# Patient Record
Sex: MALE | Race: WHITE | NOT HISPANIC OR LATINO | Employment: FULL TIME | URBAN - METROPOLITAN AREA
[De-identification: names, ages, dates, MRNs, and addresses within clinical notes are randomized per-mention and may not be internally consistent; named-entity substitution may affect disease eponyms.]

---

## 2023-07-26 ENCOUNTER — APPOINTMENT (OUTPATIENT)
Dept: GENERAL RADIOLOGY | Facility: CLINIC | Age: 63
End: 2023-07-26
Attending: EMERGENCY MEDICINE
Payer: COMMERCIAL

## 2023-07-26 ENCOUNTER — HOSPITAL ENCOUNTER (EMERGENCY)
Facility: CLINIC | Age: 63
Discharge: HOME | End: 2023-07-26
Attending: EMERGENCY MEDICINE | Admitting: EMERGENCY MEDICINE
Payer: COMMERCIAL

## 2023-07-26 VITALS
WEIGHT: 203.26 LBS | SYSTOLIC BLOOD PRESSURE: 108 MMHG | RESPIRATION RATE: 18 BRPM | BODY MASS INDEX: 29.17 KG/M2 | OXYGEN SATURATION: 98 % | DIASTOLIC BLOOD PRESSURE: 80 MMHG | HEART RATE: 59 BPM | TEMPERATURE: 98.2 F

## 2023-07-26 DIAGNOSIS — R07.9 CHEST PAIN, UNSPECIFIED TYPE: ICD-10-CM

## 2023-07-26 LAB
ANION GAP SERPL CALCULATED.3IONS-SCNC: 10 MMOL/L (ref 7–15)
BASOPHILS # BLD AUTO: 0.1 10E3/UL (ref 0–0.2)
BASOPHILS NFR BLD AUTO: 1 %
BUN SERPL-MCNC: 13.9 MG/DL (ref 8–23)
CALCIUM SERPL-MCNC: 9.4 MG/DL (ref 8.8–10.2)
CHLORIDE SERPL-SCNC: 102 MMOL/L (ref 98–107)
CREAT SERPL-MCNC: 1.09 MG/DL (ref 0.67–1.17)
DEPRECATED HCO3 PLAS-SCNC: 24 MMOL/L (ref 22–29)
EOSINOPHIL # BLD AUTO: 0.4 10E3/UL (ref 0–0.7)
EOSINOPHIL NFR BLD AUTO: 4 %
ERYTHROCYTE [DISTWIDTH] IN BLOOD BY AUTOMATED COUNT: 12.3 % (ref 10–15)
GFR SERPL CREATININE-BSD FRML MDRD: 78 ML/MIN/1.73M2
GLUCOSE SERPL-MCNC: 123 MG/DL (ref 70–99)
HCT VFR BLD AUTO: 42.5 % (ref 40–53)
HGB BLD-MCNC: 14.8 G/DL (ref 13.3–17.7)
HOLD SPECIMEN: NORMAL
HOLD SPECIMEN: NORMAL
IMM GRANULOCYTES # BLD: 0 10E3/UL
IMM GRANULOCYTES NFR BLD: 0 %
LYMPHOCYTES # BLD AUTO: 2.7 10E3/UL (ref 0.8–5.3)
LYMPHOCYTES NFR BLD AUTO: 30 %
MCH RBC QN AUTO: 32.1 PG (ref 26.5–33)
MCHC RBC AUTO-ENTMCNC: 34.8 G/DL (ref 31.5–36.5)
MCV RBC AUTO: 92 FL (ref 78–100)
MONOCYTES # BLD AUTO: 0.8 10E3/UL (ref 0–1.3)
MONOCYTES NFR BLD AUTO: 8 %
NEUTROPHILS # BLD AUTO: 5.2 10E3/UL (ref 1.6–8.3)
NEUTROPHILS NFR BLD AUTO: 57 %
NRBC # BLD AUTO: 0 10E3/UL
NRBC BLD AUTO-RTO: 0 /100
PLATELET # BLD AUTO: 226 10E3/UL (ref 150–450)
POTASSIUM SERPL-SCNC: 4 MMOL/L (ref 3.4–5.3)
RBC # BLD AUTO: 4.61 10E6/UL (ref 4.4–5.9)
SODIUM SERPL-SCNC: 136 MMOL/L (ref 136–145)
TROPONIN T SERPL HS-MCNC: <6 NG/L
TROPONIN T SERPL HS-MCNC: <6 NG/L
WBC # BLD AUTO: 9.1 10E3/UL (ref 4–11)

## 2023-07-26 PROCEDURE — 99285 EMERGENCY DEPT VISIT HI MDM: CPT | Mod: 25

## 2023-07-26 PROCEDURE — 36415 COLL VENOUS BLD VENIPUNCTURE: CPT | Performed by: EMERGENCY MEDICINE

## 2023-07-26 PROCEDURE — 93005 ELECTROCARDIOGRAM TRACING: CPT

## 2023-07-26 PROCEDURE — 80048 BASIC METABOLIC PNL TOTAL CA: CPT | Performed by: EMERGENCY MEDICINE

## 2023-07-26 PROCEDURE — 84484 ASSAY OF TROPONIN QUANT: CPT | Performed by: EMERGENCY MEDICINE

## 2023-07-26 PROCEDURE — 85025 COMPLETE CBC W/AUTO DIFF WBC: CPT | Performed by: EMERGENCY MEDICINE

## 2023-07-26 PROCEDURE — 71046 X-RAY EXAM CHEST 2 VIEWS: CPT

## 2023-07-26 RX ORDER — FAMOTIDINE 20 MG/1
20 TABLET, FILM COATED ORAL 2 TIMES DAILY
Qty: 60 TABLET | Refills: 0 | Status: SHIPPED | OUTPATIENT
Start: 2023-07-26 | End: 2023-08-25

## 2023-07-26 ASSESSMENT — ACTIVITIES OF DAILY LIVING (ADL): ADLS_ACUITY_SCORE: 35

## 2023-07-26 NOTE — ED PROVIDER NOTES
"    History     Chief Complaint:  Chest Pain       HPI     Kendell Caban is a 62 year old male who presents with chest pain and heaviness that occurred between [id number] while at work that lasted for 10 minutes. He reports feeling like a \"300 lb sheldon\" was sitting on his chest and feeling a period of immense pain. He reports pacing, but denies anything making the pain better. He reports eventually getting back to normal and feels about normal now. He experienced minor heart burn over the weekend while he was at the cabin, but reports the pain being different today. He reports experiencing shortness of breath and nothing like the pain he was in earlier. He denies daily medication use, medication allergies, smoking, family history of kidney or heart disease, or eating anything unusual while at his cabin.    Independent Historian:    None    Review of External Notes:  None    Medications:    Flexeril  Roxicodone  Flomax    Past Medical History:    Renal colic    Past Surgical History:    Cystoscopy, retrogrades, ureteroscopy, laser holmium lithotripsy ureter(s), insert stent, combined  Removal pilonidal lesion simple     Physical Exam   Patient Vitals for the past 24 hrs:   BP Temp Temp src Pulse Resp SpO2 Weight   07/26/23 1930 108/80 -- -- 59 18 -- --   07/26/23 1848 113/74 -- -- 66 16 -- --   07/26/23 1833 116/82 -- -- 61 26 -- --   07/26/23 1818 111/76 -- -- 61 14 -- --   07/26/23 1625 120/81 98.2  F (36.8  C) Oral 67 16 98 % 92.2 kg (203 lb 4.2 oz)        Physical Exam    HEENT:    Oropharynx is moist  Eyes:    Conjunctiva normal  Neck:     Supple, no meningismus.     CV:     Regular rate and rhythm.      No murmurs, rubs or gallops.       No unilateral leg swelling.       2+ radial pulses bilateral.       No lower extremity edema.  PULM:    Clear to auscultation bilateral.       No respiratory distress.      Good air exchange.     No rales or wheezing.     No stridor.  ABD:    Soft, non-tender, non-distended.  "      No pulsatile masses.       No rebound, guarding or rigidity.  MSK:     No gross deformity to all four extremities.   LYMPH:   No cervical lymphadenopathy.  NEURO:   Alert. Good muscle tone, no atrophy.  Skin:    Warm, dry and intact.    Psych:    Mood is good and affect is appropriate.      Emergency Department Course   ECG  ECG taken at 1616, ECG read at 1622  Normal sinus rhythm  Normal ECG   Rate 63 bpm. SC interval 190 ms. QRS duration 78 ms. QT/QTc 412/421 ms. P-R-T axes 35 34 15.    Imaging:  Chest XR,  PA & LAT   Final Result   IMPRESSION: Mildly elevated left hemidiaphragm with adjacent left basilar atelectasis. Right lung is clear. No pleural effusions or pneumothorax. Normal cardiac silhouette. Scattered gas-filled nondilated loops of colon are present within the upper    abdomen.        Report per radiology    Laboratory:  Labs Ordered and Resulted from Time of ED Arrival to Time of ED Departure   BASIC METABOLIC PANEL - Abnormal       Result Value    Sodium 136      Potassium 4.0      Chloride 102      Carbon Dioxide (CO2) 24      Anion Gap 10      Urea Nitrogen 13.9      Creatinine 1.09      Calcium 9.4      Glucose 123 (*)     GFR Estimate 78     TROPONIN T, HIGH SENSITIVITY - Normal    Troponin T, High Sensitivity <6     TROPONIN T, HIGH SENSITIVITY - Normal    Troponin T, High Sensitivity <6     CBC WITH PLATELETS AND DIFFERENTIAL    WBC Count 9.1      RBC Count 4.61      Hemoglobin 14.8      Hematocrit 42.5      MCV 92      MCH 32.1      MCHC 34.8      RDW 12.3      Platelet Count 226      % Neutrophils 57      % Lymphocytes 30      % Monocytes 8      % Eosinophils 4      % Basophils 1      % Immature Granulocytes 0      NRBCs per 100 WBC 0      Absolute Neutrophils 5.2      Absolute Lymphocytes 2.7      Absolute Monocytes 0.8      Absolute Eosinophils 0.4      Absolute Basophils 0.1      Absolute Immature Granulocytes 0.0      Absolute NRBCs 0.0       Emergency Department Course &  Assessments:         Interventions:  Medications - No data to display     Assessments:   I obtained history and examined the patient as noted above.    I rechecked and updated the patient. I deemed the patient safe to discharge home.    Independent Interpretation (X-rays, CTs, rhythm strip):  None    Consultations/Discussion of Management or Tests:  None       Social Determinants of Health affecting care:  None     Disposition:  The patient was discharged to home.     Impression & Plan    CMS Diagnoses: None    Medical Decision Makin-year-old male presents with a 10-minute episode of chest pressure.  EKG without ischemic changes.  Initial troponin within normal limits.  2-hour delta troponin remains undetectable and was obtained greater than 6 hours after onset of symptoms thus ruling out myocardial infarction.  He has no ongoing symptoms to suggest pulmonary embolism, aortic dissection or aortic aneurysm.  It is possible symptoms are related to esophageal spasm as he has reported recent difficulty with reflux.  Patient discharged home with a trial of famotidine.  Close follow-up with PCP and return to ED for worsening this.    Diagnosis:    ICD-10-CM    1. Chest pain, unspecified type  R07.9            Discharge Medications:  Discharge Medication List as of 2023  7:34 PM        START taking these medications    Details   famotidine (PEPCID) 20 MG tablet Take 1 tablet (20 mg) by mouth 2 times daily for 30 days, Disp-60 tablet, R-0, Local Print            Scribe Disclosure:  I, Christian Bartlett, am serving as a scribe at 6:03 PM on 2023 to document services personally performed by Aubrey House MD based on my observations and the provider's statements to me.               Aubrey House MD  [phone]

## 2023-07-26 NOTE — ED TRIAGE NOTES
"Pt reports a 10 minute episode of mid sternal chest pain at 1130 today. Pt reports diaphoresis and SOB during episode. States, \"It felt like a 300lb sheldon standing on me.\" Chest pain now resolved.      Triage Assessment       Row Name 07/26/23 1637       Triage Assessment (Adult)    Airway WDL WDL       Respiratory WDL    Respiratory WDL WDL       Skin Circulation/Temperature WDL    Skin Circulation/Temperature WDL WDL       Cardiac WDL    Cardiac WDL chest pain  Chest pain now resolved.       Peripheral/Neurovascular WDL    Peripheral Neurovascular WDL WDL       Cognitive/Neuro/Behavioral WDL    Cognitive/Neuro/Behavioral WDL WDL                    "

## 2023-07-27 LAB
ATRIAL RATE - MUSE: 63 BPM
DIASTOLIC BLOOD PRESSURE - MUSE: NORMAL MMHG
INTERPRETATION ECG - MUSE: NORMAL
P AXIS - MUSE: 35 DEGREES
PR INTERVAL - MUSE: 190 MS
QRS DURATION - MUSE: 78 MS
QT - MUSE: 412 MS
QTC - MUSE: 421 MS
R AXIS - MUSE: 34 DEGREES
SYSTOLIC BLOOD PRESSURE - MUSE: NORMAL MMHG
T AXIS - MUSE: 15 DEGREES
VENTRICULAR RATE- MUSE: 63 BPM

## 2023-07-27 NOTE — DISCHARGE INSTRUCTIONS
Please make an appointment to follow up with [facility] [phone] in 2-3 days even if entirely better.    Discharge Instructions  Chest Pain    You have been seen today for chest pain or discomfort.  At this time, your provider has found no signs that your chest pain is due to a serious or life-threatening condition, (or you have declined more testing and/or admission to the hospital). However, sometimes there is a serious problem that does not show up right away. Your evaluation today may not be complete and you may need further testing and evaluation.     Generally, every Emergency Department visit should have a follow-up clinic visit with either a primary or a specialty clinic/provider. Please follow-up as instructed by your emergency provider today.  Return to the Emergency Department if:  Your chest pain changes, gets worse, starts to happen more often, or comes with less activity.  You are newly short of breath.  You get very weak or tired.  You pass out or faint.  You have any new symptoms, like fever, cough, numb legs, or you cough up blood.  You have anything else that worries you.    Until you follow-up with your regular provider, please do the following:  Take one aspirin daily unless you have an allergy or are told not to by your provider.  If a stress test appointment has been made, go to the appointment.  If you have questions, contact your regular provider.  Follow-up with your regular provider/clinic as directed; this is very important.    If you were given a prescription for medicine here today, be sure to read all of the information (including the package insert) that comes with your prescription.  This will include important information about the medicine, its side effects, and any warnings that you need to know about.  The pharmacist who fills the prescription can provide more information and answer questions you may have about the medicine.  If you have questions or concerns  that the pharmacist cannot address, please call or return to the Emergency Department.       Remember that you can always come back to the Emergency Department if you are not able to see your regular provider in the amount of time listed above, if you get any new symptoms, or if there is anything that worries you.

## 2023-09-07 ENCOUNTER — HOSPITAL ENCOUNTER (INPATIENT)
Facility: CLINIC | Age: 63
LOS: 3 days | Discharge: HOME | DRG: 419 | End: 2023-09-10
Attending: EMERGENCY MEDICINE | Admitting: INTERNAL MEDICINE
Payer: COMMERCIAL

## 2023-09-07 ENCOUNTER — APPOINTMENT (OUTPATIENT)
Dept: ULTRASOUND IMAGING | Facility: CLINIC | Age: 63
DRG: 419 | End: 2023-09-07
Attending: EMERGENCY MEDICINE
Payer: COMMERCIAL

## 2023-09-07 DIAGNOSIS — I48.91 NEW ONSET ATRIAL FIBRILLATION (H): ICD-10-CM

## 2023-09-07 DIAGNOSIS — K21.9 GASTROESOPHAGEAL REFLUX DISEASE WITHOUT ESOPHAGITIS: Primary | ICD-10-CM

## 2023-09-07 DIAGNOSIS — K85.10 GALL STONE PANCREATITIS: ICD-10-CM

## 2023-09-07 LAB
ALBUMIN SERPL BCG-MCNC: 4.3 G/DL (ref 3.5–5.2)
ALP SERPL-CCNC: 150 U/L (ref 40–129)
ALT SERPL W P-5'-P-CCNC: 266 U/L (ref 0–70)
ANION GAP SERPL CALCULATED.3IONS-SCNC: 9 MMOL/L (ref 7–15)
APTT PPP: 24 SECONDS (ref 22–38)
AST SERPL W P-5'-P-CCNC: 379 U/L (ref 0–45)
ATRIAL RATE - MUSE: 131 BPM
BASOPHILS # BLD AUTO: 0.1 10E3/UL (ref 0–0.2)
BASOPHILS NFR BLD AUTO: 0 %
BILIRUB DIRECT SERPL-MCNC: 1.32 MG/DL (ref 0–0.3)
BILIRUB SERPL-MCNC: 2.3 MG/DL
BUN SERPL-MCNC: 14 MG/DL (ref 8–23)
CALCIUM SERPL-MCNC: 10.7 MG/DL (ref 8.8–10.2)
CHLORIDE SERPL-SCNC: 102 MMOL/L (ref 98–107)
CREAT SERPL-MCNC: 1.01 MG/DL (ref 0.67–1.17)
DEPRECATED HCO3 PLAS-SCNC: 28 MMOL/L (ref 22–29)
DIASTOLIC BLOOD PRESSURE - MUSE: NORMAL MMHG
EGFRCR SERPLBLD CKD-EPI 2021: 85 ML/MIN/1.73M2
EOSINOPHIL # BLD AUTO: 0.2 10E3/UL (ref 0–0.7)
EOSINOPHIL NFR BLD AUTO: 2 %
ERYTHROCYTE [DISTWIDTH] IN BLOOD BY AUTOMATED COUNT: 12.4 % (ref 10–15)
GLUCOSE SERPL-MCNC: 136 MG/DL (ref 70–99)
HCT VFR BLD AUTO: 45.8 % (ref 40–53)
HGB BLD-MCNC: 16.1 G/DL (ref 13.3–17.7)
HOLD SPECIMEN: NORMAL
HOLD SPECIMEN: NORMAL
IMM GRANULOCYTES # BLD: 0.1 10E3/UL
IMM GRANULOCYTES NFR BLD: 1 %
INTERPRETATION ECG - MUSE: NORMAL
LIPASE SERPL-CCNC: >3000 U/L (ref 13–60)
LYMPHOCYTES # BLD AUTO: 1.8 10E3/UL (ref 0.8–5.3)
LYMPHOCYTES NFR BLD AUTO: 14 %
MCH RBC QN AUTO: 32.1 PG (ref 26.5–33)
MCHC RBC AUTO-ENTMCNC: 35.2 G/DL (ref 31.5–36.5)
MCV RBC AUTO: 91 FL (ref 78–100)
MONOCYTES # BLD AUTO: 1.1 10E3/UL (ref 0–1.3)
MONOCYTES NFR BLD AUTO: 9 %
NEUTROPHILS # BLD AUTO: 9.9 10E3/UL (ref 1.6–8.3)
NEUTROPHILS NFR BLD AUTO: 74 %
NRBC # BLD AUTO: 0 10E3/UL
NRBC BLD AUTO-RTO: 0 /100
P AXIS - MUSE: NORMAL DEGREES
PLATELET # BLD AUTO: 249 10E3/UL (ref 150–450)
POTASSIUM SERPL-SCNC: 4.4 MMOL/L (ref 3.4–5.3)
PR INTERVAL - MUSE: NORMAL MS
PROT SERPL-MCNC: 7.7 G/DL (ref 6.4–8.3)
QRS DURATION - MUSE: 82 MS
QT - MUSE: 348 MS
QTC - MUSE: 444 MS
R AXIS - MUSE: 16 DEGREES
RBC # BLD AUTO: 5.02 10E6/UL (ref 4.4–5.9)
SODIUM SERPL-SCNC: 139 MMOL/L (ref 136–145)
SYSTOLIC BLOOD PRESSURE - MUSE: NORMAL MMHG
T AXIS - MUSE: 1 DEGREES
TROPONIN T SERPL HS-MCNC: 6 NG/L
VENTRICULAR RATE- MUSE: 98 BPM
WBC # BLD AUTO: 13.1 10E3/UL (ref 4–11)

## 2023-09-07 PROCEDURE — 84484 ASSAY OF TROPONIN QUANT: CPT | Performed by: EMERGENCY MEDICINE

## 2023-09-07 PROCEDURE — C9113 INJ PANTOPRAZOLE SODIUM, VIA: HCPCS | Performed by: SURGERY

## 2023-09-07 PROCEDURE — 99285 EMERGENCY DEPT VISIT HI MDM: CPT | Mod: 25

## 2023-09-07 PROCEDURE — 96374 THER/PROPH/DIAG INJ IV PUSH: CPT

## 2023-09-07 PROCEDURE — 250N000011 HC RX IP 250 OP 636: Mod: JZ | Performed by: INTERNAL MEDICINE

## 2023-09-07 PROCEDURE — 120N000001 HC R&B MED SURG/OB

## 2023-09-07 PROCEDURE — 82248 BILIRUBIN DIRECT: CPT | Performed by: EMERGENCY MEDICINE

## 2023-09-07 PROCEDURE — 250N000011 HC RX IP 250 OP 636: Performed by: EMERGENCY MEDICINE

## 2023-09-07 PROCEDURE — 76705 ECHO EXAM OF ABDOMEN: CPT

## 2023-09-07 PROCEDURE — C9113 INJ PANTOPRAZOLE SODIUM, VIA: HCPCS | Mod: JZ | Performed by: INTERNAL MEDICINE

## 2023-09-07 PROCEDURE — 36415 COLL VENOUS BLD VENIPUNCTURE: CPT | Performed by: INTERNAL MEDICINE

## 2023-09-07 PROCEDURE — 36415 COLL VENOUS BLD VENIPUNCTURE: CPT | Performed by: EMERGENCY MEDICINE

## 2023-09-07 PROCEDURE — 250N000011 HC RX IP 250 OP 636: Performed by: SURGERY

## 2023-09-07 PROCEDURE — 83690 ASSAY OF LIPASE: CPT | Performed by: EMERGENCY MEDICINE

## 2023-09-07 PROCEDURE — 99223 1ST HOSP IP/OBS HIGH 75: CPT | Performed by: INTERNAL MEDICINE

## 2023-09-07 PROCEDURE — 80048 BASIC METABOLIC PNL TOTAL CA: CPT | Performed by: EMERGENCY MEDICINE

## 2023-09-07 PROCEDURE — 250N000013 HC RX MED GY IP 250 OP 250 PS 637: Performed by: EMERGENCY MEDICINE

## 2023-09-07 PROCEDURE — 96375 TX/PRO/DX INJ NEW DRUG ADDON: CPT

## 2023-09-07 PROCEDURE — 93005 ELECTROCARDIOGRAM TRACING: CPT

## 2023-09-07 PROCEDURE — 250N000013 HC RX MED GY IP 250 OP 250 PS 637: Performed by: INTERNAL MEDICINE

## 2023-09-07 PROCEDURE — 258N000003 HC RX IP 258 OP 636: Performed by: INTERNAL MEDICINE

## 2023-09-07 PROCEDURE — 85025 COMPLETE CBC W/AUTO DIFF WBC: CPT | Performed by: EMERGENCY MEDICINE

## 2023-09-07 PROCEDURE — 85730 THROMBOPLASTIN TIME PARTIAL: CPT | Performed by: INTERNAL MEDICINE

## 2023-09-07 RX ORDER — DIPHENHYDRAMINE HYDROCHLORIDE AND LIDOCAINE HYDROCHLORIDE AND ALUMINUM HYDROXIDE AND MAGNESIUM HYDRO
10 KIT ONCE
Status: COMPLETED | OUTPATIENT
Start: 2023-09-07 | End: 2023-09-07

## 2023-09-07 RX ORDER — HYDROMORPHONE HYDROCHLORIDE 1 MG/ML
0.5 INJECTION, SOLUTION INTRAMUSCULAR; INTRAVENOUS; SUBCUTANEOUS ONCE
Status: COMPLETED | OUTPATIENT
Start: 2023-09-07 | End: 2023-09-07

## 2023-09-07 RX ORDER — SODIUM CHLORIDE, SODIUM LACTATE, POTASSIUM CHLORIDE, CALCIUM CHLORIDE 600; 310; 30; 20 MG/100ML; MG/100ML; MG/100ML; MG/100ML
INJECTION, SOLUTION INTRAVENOUS CONTINUOUS
Status: DISCONTINUED | OUTPATIENT
Start: 2023-09-07 | End: 2023-09-10 | Stop reason: HOSPADM

## 2023-09-07 RX ORDER — PIPERACILLIN SODIUM, TAZOBACTAM SODIUM 3; .375 G/15ML; G/15ML
3.38 INJECTION, POWDER, LYOPHILIZED, FOR SOLUTION INTRAVENOUS ONCE
Status: COMPLETED | OUTPATIENT
Start: 2023-09-07 | End: 2023-09-07

## 2023-09-07 RX ORDER — METOPROLOL TARTRATE 25 MG/1
25 TABLET, FILM COATED ORAL 2 TIMES DAILY
Status: DISCONTINUED | OUTPATIENT
Start: 2023-09-07 | End: 2023-09-10 | Stop reason: HOSPADM

## 2023-09-07 RX ORDER — HYDROMORPHONE HCL IN WATER/PF 6 MG/30 ML
.2-.4 PATIENT CONTROLLED ANALGESIA SYRINGE INTRAVENOUS EVERY 4 HOURS PRN
Status: DISCONTINUED | OUTPATIENT
Start: 2023-09-07 | End: 2023-09-09

## 2023-09-07 RX ORDER — KETOROLAC TROMETHAMINE 15 MG/ML
15 INJECTION, SOLUTION INTRAMUSCULAR; INTRAVENOUS ONCE
Status: COMPLETED | OUTPATIENT
Start: 2023-09-07 | End: 2023-09-07

## 2023-09-07 RX ORDER — LIDOCAINE 40 MG/G
CREAM TOPICAL
Status: DISCONTINUED | OUTPATIENT
Start: 2023-09-07 | End: 2023-09-10 | Stop reason: HOSPADM

## 2023-09-07 RX ADMIN — DIPHENHYDRAMINE HYDROCHLORIDE AND LIDOCAINE HYDROCHLORIDE AND ALUMINUM HYDROXIDE AND MAGNESIUM HYDRO 10 ML: KIT at 15:13

## 2023-09-07 RX ADMIN — HYDROMORPHONE HYDROCHLORIDE 0.5 MG: 1 INJECTION, SOLUTION INTRAMUSCULAR; INTRAVENOUS; SUBCUTANEOUS at 15:59

## 2023-09-07 RX ADMIN — SODIUM CHLORIDE, POTASSIUM CHLORIDE, SODIUM LACTATE AND CALCIUM CHLORIDE: 600; 310; 30; 20 INJECTION, SOLUTION INTRAVENOUS at 19:35

## 2023-09-07 RX ADMIN — PIPERACILLIN AND TAZOBACTAM 3.38 G: 3; .375 INJECTION, POWDER, FOR SOLUTION INTRAVENOUS at 19:00

## 2023-09-07 RX ADMIN — METOPROLOL TARTRATE 25 MG: 25 TABLET, FILM COATED ORAL at 19:51

## 2023-09-07 RX ADMIN — PANTOPRAZOLE SODIUM 40 MG: 40 INJECTION, POWDER, FOR SOLUTION INTRAVENOUS at 19:51

## 2023-09-07 RX ADMIN — KETOROLAC TROMETHAMINE 15 MG: 15 INJECTION, SOLUTION INTRAMUSCULAR; INTRAVENOUS at 14:56

## 2023-09-07 ASSESSMENT — ACTIVITIES OF DAILY LIVING (ADL)
ADLS_ACUITY_SCORE: 37
ADLS_ACUITY_SCORE: 37
ADLS_ACUITY_SCORE: 35

## 2023-09-07 NOTE — PHARMACY-ADMISSION MEDICATION HISTORY
Pharmacist Admission Medication History    Admission medication history is complete. The information provided in this note is only as accurate as the sources available at the time of the update.    Medication reconciliation/reorder completed by provider prior to medication history? No    Information Source(s): Patient and MD  via in-person    Pertinent Information: none    Changes made to PTA medication list:  Added: None  Deleted: flexeril, Motrin, oxycodone, flomax  Changed: None    Medication Affordability:  Not including over the counter (OTC) medications, was there a time in the past 3 months when you did not take your medications as prescribed because of cost?: No    Allergies reviewed with patient and updates made in EHR: yes    Medication History Completed By: Markos Waite RPH 9/7/2023 6:33 PM    Prior to Admission medications    Not on File

## 2023-09-07 NOTE — ED TRIAGE NOTES
"Hx of EOE. Epigastric pain after eating cheerios. Can't take deep breaths d/t pain. Vomited cheerios \"that never really went down\". GI told pt to come here to r/o cardiac issues.        "

## 2023-09-07 NOTE — ED PROVIDER NOTES
PIT/Triage Evaluation    Patient presented with severe chest and epigastric pain.  Patient is a 62-year-old male who was seen here in the end of July for chest pain EKG and lab work was unremarkable and discharged home.  Patient has a history of possible reflux.  Has developed lower sternal and upper abdominal pain without radiation.  Feels things get stuck when he eats.  Worried about reflux.  Denies shortness of breath.  Denies cough or fever.    Exam is notable for:    Patient Vitals for the past 24 hrs:   BP Temp Temp src Pulse Resp SpO2 Weight   09/07/23 1445 (!) 131/90 -- -- -- -- -- --   09/07/23 1441 -- 97.6  F (36.4  C) Temporal 107 18 100 % 91.2 kg (201 lb 1 oz)     Uncomfortable appearing.  Mild tenderness in the epigastrium.  Heart is irregularly irregular but normal rate.    Appropriate interventions for symptom management were initiated if applicable.  Appropriate diagnostic tests were initiated if indicated.    Important information for subsequent clinician:  Patient has a history of need for endoscopy has not been performed.  Has an EKG showing new onset A-fib with normal rate control.  Concerned about the need for endoscopy as well as new onset A-fib.    I briefly evaluated the patient and developed an initial plan of care. I discussed this plan and explained that this brief interaction does not constitute a full evaluation. Patient/family understands that they should wait to be fully evaluated and discuss any test results with another clinician prior to leaving the hospital.       Froilan Mcdonald MD  09/07/23 5411

## 2023-09-07 NOTE — H&P
[facility]    History and Physical - Hospitalist Service       Date of Admission:  9/7/2023  Primary Care Physician   Physician No Ref-Primary  CONSULTANTS: GI, [facility]    Assessment & Plan      Kendell Caban is a 62 year old male admitted on 9/7/2023. He is here with gallstone pancreatitis       Cholelithasis, cholecystasis, gallstone pancreatitis, less likely cholangitis, elevated lfts, fatty liver   Patient presented with severe abdominal pain rating to his back after eating was associated with some nausea and vomiting.  In the emergency room his LFTs are elevated with his alk phos 150, , , and his lipase is greater than 3000.  He did have a slight white count at 13.1.  On exam he has no peritoneal signs and does not look septic.  His pain has subsided with Dilaudid.  Patient has no fever.  In the emergency room was given a dose of Zosyn for possible cholangitis but at this point I think it is reasonable to repeat his labs in the morning and hold off on further antibiotics.  With his LFTs being up he is at high risk for having choledocholithiasis and should have an ERCP, will consult GI.  Patient is worried that food does get stuck when he eats and that he has severe reflux.  I think this most likely is all his gallbladder disease but having GI see him for possible scope would be helpful.  Place the patient.  Clear on Protonix.  We will make the patient n.p.o. midnight and start the patient on IV fluids.  He is only been eating crackers in the ER and he was told that if he develops any pain at all to stop eating.  We will need to get surgery involved once we know more from GI.  He most certainly needs cholecystectomy.  Patient without signs of cholecystitis on ultrasound.  It Does show however he has a fatty liver.    New onset atrial fibrillation, likely paroxysmal   Patient was found to be in atrial fibrillation in the emergency room and was mostly rate controlled.  Was going  up to 120 at times however.  We will check a TSH making sure hyperthyroidism is not a contributing factor.  He denies any alcohol abuse.  He does however admit to drinking quite a bit of caffeine.  We will check an echocardiogram.  Follow patient on telemetry.  And I will start him on a beta-blocker for rate control.  At this point if he cardiovert on his own quickly he probably does not need to be on anticoagulation however if he remains in it then will need cardioversion in a month and will need to be started on a DOAC prior to discharge.  We will hold off for now for possible surgery.  If the patient converts into a normal sinus rhythm, he probably would benefit from a Zio patch making sure he does not went in and out of atrial fibrillation at home which would require further treatment.  His troponin was negative.  Patient does not know that he is in atrial fibrillation has no palpitations.  Is not clear truly how long he has been in this.  I do suspect this for the short time is likely paroxysmal.    History of depression   Remote, not currently on any medications     history renal stone/ genital herpes   Striae noted      Discussed plan of care with Girlfriend bedside, Dr Mcdonald in the emergency room.       Diet: Regular Diet Adult  , NPO at midnight  DVT Prophylaxis: Pneumatic Compression Devices  Galloway Catheter: Not present  Lines: None     Cardiac Monitoring: None    RESTRAINTS: Not indicated  Code Status:  FULL          This document was created using voice recognition technology.  Please excuse any typographical errors that may have occurred.  Please call with any questions.         Clinically Significant Risk Factors Present on Admission           # Hypercalcemia: Highest Ca = 10.7 mg/dL in last 2 days, will monitor as appropriate                        Disposition Plan  Awaiting GI/surgery work up     Expected Discharge Date: 09/09/2023                  Tremayne Martinez MD  Hospitalist Service  [facility]  Securely message with Rowl (more info)  Text page via [employer] Paging/Directory     Length of stay: Anticipate greater than 2 midnight hospitalization for evaluation and treatment of gallstone pancreatitis          ______________________________________________________________________    Chief Complaint   Chest pain    History is obtained from the patient  EMR reviewed    History of Present Illness   Kendell Caban is a 62 year old male who has been pretty healthy with only renal stones and depression that is significant.  He takes no medications.  Patient presented to the emergency room with severe upper gastric/chest pain.  Patient has similar incident in 7/26/2023 when he thought he was having a heart attack but work-up was negative and he was sent home.  Now he presents again epigastric pain after eating Cheerios.  He had some shortness of breath associated with it.  He also had some nausea and had some vomiting.  In the emergency room he had a work-up with an abdominal ultrasound showing cholelithiasis along with elevated LFTs, bilirubin, and a lipase greater than 3000.  His pain did radiate to his back.  He always thought that he was having issues with food getting stuck and that this was reflux.  Dates that his dad had significant problems and it sounded like he had a Nissen fundoplication.  Patient was given Dilaudid in the emergency room and his pain subsided.  Patient works as a zabala and is able to carry, lift, and walk what and where he wants to go without any dyspnea or chest pain.  His troponin was negative in the emergency room.  His EKG was showing no signs of ischemia.  Unfortunately for the patient, his EKG/telemetry did show new onset atrial fibrillation.  He has no chest pain or shortness of breath to speak of at baseline.  Does not feel any palpitations.  Not ever had this before.  Denies any current fever, chills, sweats.  Patient states he drinks no alcohol  but does drink quite a bit of caffeine with Mountain Dew.  In the emergency room his heart rate was going up to the 120s while I was sitting with him.  While resting in the ER, he has no current pain.      Past Medical History    Past Medical History:   Diagnosis Date    Cholelithiasis     Depression     Kidney stone     Paroxysmal A-fib (H) 09/07/2023    Unspecified viral hepatitis without mention of hepatic coma     as infant       Past Surgical History   Past Surgical History:   Procedure Laterality Date    COMBINED CYSTOSCOPY, RETROGRADES, URETEROSCOPY, LASER HOLMIUM LITHOTRIPSY URETER(S), INSERT STENT Left 3/25/2016    Procedure: COMBINED CYSTOSCOPY, RETROGRADES, URETEROSCOPY, LASER HOLMIUM LITHOTRIPSY URETER(S), INSERT STENT;  Surgeon: Gen Funes MD;  Location: [facility] OR    REMV PILONIDAL LESION SIMPLE         Prior to Admission Medications   None           REVIEW OF SYSTEMS:  A comprehensive review of systems was negative except for items noted in the HPI.  Patient currently denies any fever, chills, sweats, nausea, vomiting, diarrhea, shortness of breath, or chest pain       Physical Exam   Vital Signs: Temp: 97.6  F (36.4  C) Temp src: Temporal BP: (!) 139/98 Pulse: 93   Resp: 20 SpO2: 96 % O2 Device: None (Room air)    Weight: 201 lbs .95 oz    General appearance: Patient is alert and oriented x3, no apparent distress, pleasant and conversing normally, speaking in full sentences, appears stated age sitting up in a cot in the ER  HEENT:  Atraumatic/normal cephalic, EOMI, Pupils equally round and reactive to light, sclera non-icteric, Mucous membranes are moist  NECK:  supple without bruit or lymphadenopathy  RESPIRATORY: Clear to auscultation bilateral, good air movement  CARDIOVASCULAR: Irregularly irregular and slightly tachycardic, normal S1/S2, no murmurs, rubs, or gallops present, peripheral pulses intact  GASTROINTESTINAL: Non-distended, non-tender to deep palpation, soft, bowel sounds  present throughout, no mass or hepatosplenomegaly  MUSCULOSKELETAL: without deformity, normal range of motion  SKIN:  Warm, dry, no rashes, no mottling  NEUROLOGIC:  Cranial nerves II-XII intact, without any focal deficits, strength 5/5 throughout  EXTREMITIES:  Moves all extremities, no clubbing, cyanosis, nor edema  :  Galloway not present         Data     I have personally reviewed the following data over the past 24 hrs:    13.1 (H)  \   16.1   / 249     139 102 14.0 /  136 (H)   4.4 28 1.01 \     ALT: 266 (H) AST: 379 (H) AP: 150 (H) TBILI: 2.3 (H)   ALB: 4.3 TOT PROTEIN: 7.7 LIPASE: >3,000 (H)     Trop: 6 BNP: N/A       Imaging:   Results for orders placed or performed during the hospital encounter of 09/07/23   Abdomen US, limited (RUQ only)    Narrative    US ABDOMEN LIMITED 9/7/2023 4:23 PM    CLINICAL HISTORY: eval for elevated lfts,   TECHNIQUE: Limited abdominal ultrasound.    COMPARISON: CT 3/13/2016    FINDINGS:    GALLBLADDER: Multiple stones within a nondistended gallbladder. No  definite wall thickening or pericholecystic fluid. Sonographic Randhawa  sign is negative.    BILE DUCTS: There is no intrahepatic biliary dilatation. The common  duct measures 5 mm.    LIVER: Diffusely increased echogenicity with decreased conspicuity of  portal triads.    RIGHT KIDNEY: No hydronephrosis.    PANCREAS: The pancreas is largely obscured by overlying gas.    No ascites.      Impression    IMPRESSION:  1.  Cholelithiasis without definite sonographic evidence of acute  cholecystitis or biliary obstruction.  2.  Hepatic steatosis.    SHELLEY MOHAN MD         SYSTEM ID:  [id number]     Procedures: ERCP and likely cholecystectomy    I have personally have reviewed the patient's most up to date radiologic exams, EKG, labs, orders, and medications myself

## 2023-09-07 NOTE — ED PROVIDER NOTES
"  History     Chief Complaint:  No chief complaint on file.       HPI   Kendell Caban is a 62 year old male who presents with chest pain.  Patient is a 62-year-old male who was seen in July for chest pain serial troponins and EKG were normal.  Patient does have a history of reflux.  Was eating Cheerios today and developed chest pain.  He feels like things get stuck when he eats.  He has been worried about reflux.  He denies change with respiration or position but nothing gets better and is uncomfortable with it.  He does feel short of breath with it.  He has had no fever.  Denies alcohol use.  Does admit to one episode of gallstones but states the \"gallstone went away\".      Independent Historian:   Spouse/Partner - They report history of gall stones    Review of External Notes:   Recent ER visit in july       Medications:    cyclobenzaprine (FLEXERIL) 10 MG tablet  ibuprofen (ADVIL,MOTRIN) 600 MG tablet  oxyCODONE (ROXICODONE) 5 MG immediate release tablet  OxyCODONE HCl (OXYCONTIN PO)  tamsulosin (FLOMAX) 0.4 MG 24 hr capsule        Past Medical History:    Past Medical History:   Diagnosis Date    Unspecified viral hepatitis without mention of hepatic coma        Past Surgical History:    Past Surgical History:   Procedure Laterality Date    COMBINED CYSTOSCOPY, RETROGRADES, URETEROSCOPY, LASER HOLMIUM LITHOTRIPSY URETER(S), INSERT STENT Left 3/25/2016    Procedure: COMBINED CYSTOSCOPY, RETROGRADES, URETEROSCOPY, LASER HOLMIUM LITHOTRIPSY URETER(S), INSERT STENT;  Surgeon: Gen Funes MD;  Location: [facility] OR    REMV PILONIDAL LESION SIMPLE          Physical Exam   Patient Vitals for the past 24 hrs:   BP Temp Temp src Pulse Resp SpO2 Weight   09/07/23 1502 -- -- -- 93 20 96 % --   09/07/23 1457 (!) 139/98 -- -- 86 -- 96 % --   09/07/23 1445 (!) 131/90 -- -- -- -- -- --   09/07/23 1441 -- 97.6  F (36.4  C) Temporal 107 18 100 % 91.2 kg (201 lb 1 oz)        Physical Exam  Constitutional:       " Appearance: He is obese.   HENT:      Head: Normocephalic.      Mouth/Throat:      Mouth: Mucous membranes are moist.   Eyes:      General: No scleral icterus.  Cardiovascular:      Rate and Rhythm: Normal rate and regular rhythm.   Pulmonary:      Effort: Pulmonary effort is normal.      Breath sounds: Normal breath sounds.   Abdominal:      General: Abdomen is flat. Bowel sounds are normal.      Comments: Mild epigastric tenderness no guarding or rebound.   Skin:     General: Skin is warm.      Capillary Refill: Capillary refill takes less than 2 seconds.      Coloration: Skin is not jaundiced.   Neurological:      General: No focal deficit present.      Mental Status: He is alert and oriented to person, place, and time.   Psychiatric:         Mood and Affect: Mood normal.         Emergency Department Course   ECG  ECG taken at 1300, ECG read at 1305  Rate 98 bpm. VA interval  ms. QRS duration 82 ms. QT/QTc 348/444 ms. atrial fibrillation normal ventricular response.    Imaging:  Abdomen US, limited (RUQ only)   Final Result   IMPRESSION:   1.  Cholelithiasis without definite sonographic evidence of acute   cholecystitis or biliary obstruction.   2.  Hepatic steatosis.      SHELLEY MOHAN MD            SYSTEM ID:  [id number]         Report per radiology    Laboratory:  Labs Ordered and Resulted from Time of ED Arrival to Time of ED Departure   BASIC METABOLIC PANEL - Abnormal       Result Value    Sodium 139      Potassium 4.4      Chloride 102      Carbon Dioxide (CO2) 28      Anion Gap 9      Urea Nitrogen 14.0      Creatinine 1.01      Calcium 10.7 (*)     Glucose 136 (*)     GFR Estimate 85     CBC WITH PLATELETS AND DIFFERENTIAL - Abnormal    WBC Count 13.1 (*)     RBC Count 5.02      Hemoglobin 16.1      Hematocrit 45.8      MCV 91      MCH 32.1      MCHC 35.2      RDW 12.4      Platelet Count 249      % Neutrophils 74      % Lymphocytes 14      % Monocytes 9      % Eosinophils 2      % Basophils 0      %  Immature Granulocytes 1      NRBCs per 100 WBC 0      Absolute Neutrophils 9.9 (*)     Absolute Lymphocytes 1.8      Absolute Monocytes 1.1      Absolute Eosinophils 0.2      Absolute Basophils 0.1      Absolute Immature Granulocytes 0.1      Absolute NRBCs 0.0     HEPATIC FUNCTION PANEL - Abnormal    Protein Total 7.7      Albumin 4.3      Bilirubin Total 2.3 (*)     Alkaline Phosphatase 150 (*)      (*)      (*)     Bilirubin Direct 1.32 (*)    LIPASE - Abnormal    Lipase >3,000 (*)    TROPONIN T, HIGH SENSITIVITY - Normal    Troponin T, High Sensitivity 6          Emergency Department Course & Assessments:             Interventions:  Medications   ketorolac (TORADOL) injection 15 mg (15 mg Intravenous $Given 9/7/23 6868)   magic mouthwash suspension (diphenhydramine, lidocaine, aluminum-magnesium & simethicone) (10 mLs Oral $Given 9/7/23 2680)        Assessments:      Independent Interpretation (X-rays, CTs, rhythm strip):  None    Consultations/Discussion of Management or Tests:  None        Social Determinants of Health affecting care:   None    Disposition:  The patient was admitted to the hospital under the care of Dr. Lopez.     Impression & Plan      Medical Decision Making:  Patient is a 62-year-old male who presents with what is described as chest pain but to my examination is more like epigastric pain.  Lab work shows significant increases in his liver function test in the lab for lipase greater than 3000.  Patient has known gallstones but ultrasound shows no CRC but clear CBD obstructions though 5.1 mm seems at the upper limit of normal for his age.  Care was discussed with the hospitalist is due to inability to rule out common bile duct obstruction in the GERD setting of gallstone pancreatitis and new onset A-fib recommend admission for serial troponins further work-up including MRCP or GI consultation for ERCP and surgical consultation for likely gallbladder removal.  Care was  discussed with the patient and was admitted as an inpatient.      Diagnosis:    ICD-10-CM    1. New onset atrial fibrillation (H)  I48.91       2. Gall stone pancreatitis  K85.10            Discharge Medications:  New Prescriptions    No medications on file          Froilan Mcdonald MD  9/7/2023   Froilan Mcdonald MD Goodman, Brian Samuel, MD  09/07/23 6957

## 2023-09-08 ENCOUNTER — APPOINTMENT (OUTPATIENT)
Dept: MRI IMAGING | Facility: CLINIC | Age: 63
DRG: 419 | End: 2023-09-08
Attending: PHYSICIAN ASSISTANT
Payer: COMMERCIAL

## 2023-09-08 ENCOUNTER — ANESTHESIA EVENT (OUTPATIENT)
Dept: SURGERY | Facility: CLINIC | Age: 63
DRG: 419 | End: 2023-09-08
Payer: COMMERCIAL

## 2023-09-08 ENCOUNTER — APPOINTMENT (OUTPATIENT)
Dept: CARDIOLOGY | Facility: CLINIC | Age: 63
DRG: 419 | End: 2023-09-08
Attending: INTERNAL MEDICINE
Payer: COMMERCIAL

## 2023-09-08 ENCOUNTER — ANESTHESIA (OUTPATIENT)
Dept: SURGERY | Facility: CLINIC | Age: 63
DRG: 419 | End: 2023-09-08
Payer: COMMERCIAL

## 2023-09-08 ENCOUNTER — APPOINTMENT (OUTPATIENT)
Dept: GENERAL RADIOLOGY | Facility: CLINIC | Age: 63
DRG: 419 | End: 2023-09-08
Attending: INTERNAL MEDICINE
Payer: COMMERCIAL

## 2023-09-08 LAB
ALBUMIN SERPL BCG-MCNC: 3.6 G/DL (ref 3.5–5.2)
ALP SERPL-CCNC: 151 U/L (ref 40–129)
ALT SERPL W P-5'-P-CCNC: 383 U/L (ref 0–70)
ANION GAP SERPL CALCULATED.3IONS-SCNC: 8 MMOL/L (ref 7–15)
AST SERPL W P-5'-P-CCNC: 316 U/L (ref 0–45)
BILIRUB SERPL-MCNC: 5 MG/DL
BUN SERPL-MCNC: 12.9 MG/DL (ref 8–23)
CALCIUM SERPL-MCNC: 9.4 MG/DL (ref 8.8–10.2)
CHLORIDE SERPL-SCNC: 104 MMOL/L (ref 98–107)
CREAT SERPL-MCNC: 1.19 MG/DL (ref 0.67–1.17)
DEPRECATED HCO3 PLAS-SCNC: 28 MMOL/L (ref 22–29)
EGFRCR SERPLBLD CKD-EPI 2021: 70 ML/MIN/1.73M2
ERCP: NORMAL
ERYTHROCYTE [DISTWIDTH] IN BLOOD BY AUTOMATED COUNT: 12.6 % (ref 10–15)
GLUCOSE BLDC GLUCOMTR-MCNC: 91 MG/DL (ref 70–99)
GLUCOSE SERPL-MCNC: 110 MG/DL (ref 70–99)
HCT VFR BLD AUTO: 41.2 % (ref 40–53)
HGB BLD-MCNC: 14.6 G/DL (ref 13.3–17.7)
INR PPP: 1.07 (ref 0.85–1.15)
LIPASE SERPL-CCNC: 648 U/L (ref 13–60)
LVEF ECHO: NORMAL
MCH RBC QN AUTO: 32.4 PG (ref 26.5–33)
MCHC RBC AUTO-ENTMCNC: 35.4 G/DL (ref 31.5–36.5)
MCV RBC AUTO: 91 FL (ref 78–100)
PLATELET # BLD AUTO: 227 10E3/UL (ref 150–450)
POTASSIUM SERPL-SCNC: 3.9 MMOL/L (ref 3.4–5.3)
PROT SERPL-MCNC: 6 G/DL (ref 6.4–8.3)
RBC # BLD AUTO: 4.51 10E6/UL (ref 4.4–5.9)
SODIUM SERPL-SCNC: 140 MMOL/L (ref 136–145)
TSH SERPL DL<=0.005 MIU/L-ACNC: 1.89 UIU/ML (ref 0.3–4.2)
WBC # BLD AUTO: 10.4 10E3/UL (ref 4–11)

## 2023-09-08 PROCEDURE — C9113 INJ PANTOPRAZOLE SODIUM, VIA: HCPCS | Performed by: SURGERY

## 2023-09-08 PROCEDURE — 999N000180 XR SURGERY CARM FLUORO LESS THAN 5 MIN: Mod: TC

## 2023-09-08 PROCEDURE — 250N000011 HC RX IP 250 OP 636: Mod: JZ | Performed by: NURSE ANESTHETIST, CERTIFIED REGISTERED

## 2023-09-08 PROCEDURE — 710N000009 HC RECOVERY PHASE 1, LEVEL 1, PER MIN: Performed by: INTERNAL MEDICINE

## 2023-09-08 PROCEDURE — 250N000009 HC RX 250: Performed by: NURSE ANESTHETIST, CERTIFIED REGISTERED

## 2023-09-08 PROCEDURE — 999N000208 ECHOCARDIOGRAM COMPLETE

## 2023-09-08 PROCEDURE — C2617 STENT, NON-COR, TEM W/O DEL: HCPCS | Performed by: INTERNAL MEDICINE

## 2023-09-08 PROCEDURE — 370N000017 HC ANESTHESIA TECHNICAL FEE, PER MIN: Performed by: INTERNAL MEDICINE

## 2023-09-08 PROCEDURE — 84443 ASSAY THYROID STIM HORMONE: CPT | Performed by: INTERNAL MEDICINE

## 2023-09-08 PROCEDURE — 93306 TTE W/DOPPLER COMPLETE: CPT | Mod: 26 | Performed by: INTERNAL MEDICINE

## 2023-09-08 PROCEDURE — C1769 GUIDE WIRE: HCPCS | Performed by: INTERNAL MEDICINE

## 2023-09-08 PROCEDURE — A9585 GADOBUTROL INJECTION: HCPCS | Performed by: PHYSICIAN ASSISTANT

## 2023-09-08 PROCEDURE — 360N000082 HC SURGERY LEVEL 2 W/ FLUORO, PER MIN: Performed by: INTERNAL MEDICINE

## 2023-09-08 PROCEDURE — 255N000002 HC RX 255 OP 636: Performed by: PHYSICIAN ASSISTANT

## 2023-09-08 PROCEDURE — 120N000001 HC R&B MED SURG/OB

## 2023-09-08 PROCEDURE — 36415 COLL VENOUS BLD VENIPUNCTURE: CPT | Performed by: INTERNAL MEDICINE

## 2023-09-08 PROCEDURE — C1894 INTRO/SHEATH, NON-LASER: HCPCS | Performed by: INTERNAL MEDICINE

## 2023-09-08 PROCEDURE — 272N000001 HC OR GENERAL SUPPLY STERILE: Performed by: INTERNAL MEDICINE

## 2023-09-08 PROCEDURE — 0F798ZZ DILATION OF COMMON BILE DUCT, VIA NATURAL OR ARTIFICIAL OPENING ENDOSCOPIC: ICD-10-PCS | Performed by: INTERNAL MEDICINE

## 2023-09-08 PROCEDURE — 74183 MRI ABD W/O CNTR FLWD CNTR: CPT

## 2023-09-08 PROCEDURE — 250N000009 HC RX 250: Performed by: INTERNAL MEDICINE

## 2023-09-08 PROCEDURE — 250N000011 HC RX IP 250 OP 636: Performed by: PHYSICIAN ASSISTANT

## 2023-09-08 PROCEDURE — 250N000011 HC RX IP 250 OP 636: Mod: JZ | Performed by: INTERNAL MEDICINE

## 2023-09-08 PROCEDURE — 258N000003 HC RX IP 258 OP 636: Performed by: INTERNAL MEDICINE

## 2023-09-08 PROCEDURE — 999N000141 HC STATISTIC PRE-PROCEDURE NURSING ASSESSMENT: Performed by: INTERNAL MEDICINE

## 2023-09-08 PROCEDURE — 99232 SBSQ HOSP IP/OBS MODERATE 35: CPT | Performed by: HOSPITALIST

## 2023-09-08 PROCEDURE — 250N000011 HC RX IP 250 OP 636: Performed by: NURSE ANESTHETIST, CERTIFIED REGISTERED

## 2023-09-08 PROCEDURE — 85610 PROTHROMBIN TIME: CPT | Performed by: INTERNAL MEDICINE

## 2023-09-08 PROCEDURE — 82040 ASSAY OF SERUM ALBUMIN: CPT | Performed by: INTERNAL MEDICINE

## 2023-09-08 PROCEDURE — 250N000025 HC SEVOFLURANE, PER MIN: Performed by: INTERNAL MEDICINE

## 2023-09-08 PROCEDURE — 258N000003 HC RX IP 258 OP 636: Performed by: NURSE ANESTHETIST, CERTIFIED REGISTERED

## 2023-09-08 PROCEDURE — 250N000011 HC RX IP 250 OP 636: Performed by: SURGERY

## 2023-09-08 PROCEDURE — 258N000003 HC RX IP 258 OP 636: Performed by: ANESTHESIOLOGY

## 2023-09-08 PROCEDURE — 85027 COMPLETE CBC AUTOMATED: CPT | Performed by: INTERNAL MEDICINE

## 2023-09-08 PROCEDURE — C9113 INJ PANTOPRAZOLE SODIUM, VIA: HCPCS | Mod: JZ | Performed by: INTERNAL MEDICINE

## 2023-09-08 PROCEDURE — 83690 ASSAY OF LIPASE: CPT | Performed by: INTERNAL MEDICINE

## 2023-09-08 PROCEDURE — 255N000002 HC RX 255 OP 636: Mod: JZ | Performed by: INTERNAL MEDICINE

## 2023-09-08 PROCEDURE — 255N000002 HC RX 255 OP 636: Performed by: INTERNAL MEDICINE

## 2023-09-08 RX ORDER — GLYCOPYRROLATE 0.2 MG/ML
INJECTION, SOLUTION INTRAMUSCULAR; INTRAVENOUS PRN
Status: DISCONTINUED | OUTPATIENT
Start: 2023-09-08 | End: 2023-09-08

## 2023-09-08 RX ORDER — SODIUM CHLORIDE, SODIUM LACTATE, POTASSIUM CHLORIDE, CALCIUM CHLORIDE 600; 310; 30; 20 MG/100ML; MG/100ML; MG/100ML; MG/100ML
INJECTION, SOLUTION INTRAVENOUS CONTINUOUS
Status: CANCELLED | OUTPATIENT
Start: 2023-09-08

## 2023-09-08 RX ORDER — NEOSTIGMINE METHYLSULFATE 1 MG/ML
VIAL (ML) INJECTION PRN
Status: DISCONTINUED | OUTPATIENT
Start: 2023-09-08 | End: 2023-09-08

## 2023-09-08 RX ORDER — FENTANYL CITRATE 50 UG/ML
25 INJECTION, SOLUTION INTRAMUSCULAR; INTRAVENOUS EVERY 5 MIN PRN
Status: CANCELLED | OUTPATIENT
Start: 2023-09-08

## 2023-09-08 RX ORDER — SODIUM CHLORIDE, SODIUM LACTATE, POTASSIUM CHLORIDE, CALCIUM CHLORIDE 600; 310; 30; 20 MG/100ML; MG/100ML; MG/100ML; MG/100ML
INJECTION, SOLUTION INTRAVENOUS CONTINUOUS PRN
Status: DISCONTINUED | OUTPATIENT
Start: 2023-09-08 | End: 2023-09-08

## 2023-09-08 RX ORDER — HYDROMORPHONE HCL IN WATER/PF 6 MG/30 ML
0.4 PATIENT CONTROLLED ANALGESIA SYRINGE INTRAVENOUS EVERY 5 MIN PRN
Status: CANCELLED | OUTPATIENT
Start: 2023-09-08

## 2023-09-08 RX ORDER — LIDOCAINE 40 MG/G
CREAM TOPICAL
Status: DISCONTINUED | OUTPATIENT
Start: 2023-09-08 | End: 2023-09-08 | Stop reason: HOSPADM

## 2023-09-08 RX ORDER — CIPROFLOXACIN 2 MG/ML
400 INJECTION, SOLUTION INTRAVENOUS
Status: COMPLETED | OUTPATIENT
Start: 2023-09-08 | End: 2023-09-08

## 2023-09-08 RX ORDER — KETOROLAC TROMETHAMINE 15 MG/ML
15 INJECTION, SOLUTION INTRAMUSCULAR; INTRAVENOUS
Status: CANCELLED | OUTPATIENT
Start: 2023-09-08

## 2023-09-08 RX ORDER — DEXAMETHASONE SODIUM PHOSPHATE 4 MG/ML
INJECTION, SOLUTION INTRA-ARTICULAR; INTRALESIONAL; INTRAMUSCULAR; INTRAVENOUS; SOFT TISSUE PRN
Status: DISCONTINUED | OUTPATIENT
Start: 2023-09-08 | End: 2023-09-08

## 2023-09-08 RX ORDER — ONDANSETRON 2 MG/ML
4 INJECTION INTRAMUSCULAR; INTRAVENOUS EVERY 30 MIN PRN
Status: CANCELLED | OUTPATIENT
Start: 2023-09-08

## 2023-09-08 RX ORDER — PROPOFOL 10 MG/ML
INJECTION, EMULSION INTRAVENOUS PRN
Status: DISCONTINUED | OUTPATIENT
Start: 2023-09-08 | End: 2023-09-08

## 2023-09-08 RX ORDER — FENTANYL CITRATE 50 UG/ML
50 INJECTION, SOLUTION INTRAMUSCULAR; INTRAVENOUS EVERY 5 MIN PRN
Status: CANCELLED | OUTPATIENT
Start: 2023-09-08

## 2023-09-08 RX ORDER — HYDROMORPHONE HCL IN WATER/PF 6 MG/30 ML
0.2 PATIENT CONTROLLED ANALGESIA SYRINGE INTRAVENOUS EVERY 5 MIN PRN
Status: CANCELLED | OUTPATIENT
Start: 2023-09-08

## 2023-09-08 RX ORDER — GADOBUTROL 604.72 MG/ML
9 INJECTION INTRAVENOUS ONCE
Status: COMPLETED | OUTPATIENT
Start: 2023-09-08 | End: 2023-09-08

## 2023-09-08 RX ORDER — ONDANSETRON 2 MG/ML
INJECTION INTRAMUSCULAR; INTRAVENOUS PRN
Status: DISCONTINUED | OUTPATIENT
Start: 2023-09-08 | End: 2023-09-08

## 2023-09-08 RX ORDER — FENTANYL CITRATE 50 UG/ML
INJECTION, SOLUTION INTRAMUSCULAR; INTRAVENOUS PRN
Status: DISCONTINUED | OUTPATIENT
Start: 2023-09-08 | End: 2023-09-08

## 2023-09-08 RX ORDER — INDOMETHACIN 50 MG/1
100 SUPPOSITORY RECTAL
Status: DISCONTINUED | OUTPATIENT
Start: 2023-09-08 | End: 2023-09-08 | Stop reason: HOSPADM

## 2023-09-08 RX ORDER — ONDANSETRON 4 MG/1
4 TABLET, ORALLY DISINTEGRATING ORAL EVERY 30 MIN PRN
Status: CANCELLED | OUTPATIENT
Start: 2023-09-08

## 2023-09-08 RX ORDER — SODIUM CHLORIDE, SODIUM LACTATE, POTASSIUM CHLORIDE, CALCIUM CHLORIDE 600; 310; 30; 20 MG/100ML; MG/100ML; MG/100ML; MG/100ML
INJECTION, SOLUTION INTRAVENOUS CONTINUOUS
Status: DISCONTINUED | OUTPATIENT
Start: 2023-09-08 | End: 2023-09-08 | Stop reason: HOSPADM

## 2023-09-08 RX ORDER — OXYCODONE HYDROCHLORIDE 5 MG/1
10 TABLET ORAL
Status: CANCELLED | OUTPATIENT
Start: 2023-09-08

## 2023-09-08 RX ORDER — OXYCODONE HYDROCHLORIDE 5 MG/1
5 TABLET ORAL
Status: CANCELLED | OUTPATIENT
Start: 2023-09-08

## 2023-09-08 RX ORDER — LIDOCAINE HYDROCHLORIDE 20 MG/ML
INJECTION, SOLUTION INFILTRATION; PERINEURAL PRN
Status: DISCONTINUED | OUTPATIENT
Start: 2023-09-08 | End: 2023-09-08

## 2023-09-08 RX ADMIN — PANTOPRAZOLE SODIUM 40 MG: 40 INJECTION, POWDER, FOR SOLUTION INTRAVENOUS at 08:40

## 2023-09-08 RX ADMIN — PHENYLEPHRINE HYDROCHLORIDE 200 MCG: 10 INJECTION INTRAVENOUS at 15:13

## 2023-09-08 RX ADMIN — GLYCOPYRROLATE 0.8 MG: 0.2 INJECTION, SOLUTION INTRAMUSCULAR; INTRAVENOUS at 15:56

## 2023-09-08 RX ADMIN — CIPROFLOXACIN 400 MG: 2 INJECTION, SOLUTION INTRAVENOUS at 13:58

## 2023-09-08 RX ADMIN — MIDAZOLAM 2 MG: 1 INJECTION INTRAMUSCULAR; INTRAVENOUS at 14:52

## 2023-09-08 RX ADMIN — HUMAN ALBUMIN MICROSPHERES AND PERFLUTREN 3 ML: 10; .22 INJECTION, SOLUTION INTRAVENOUS at 08:31

## 2023-09-08 RX ADMIN — ONDANSETRON 4 MG: 2 INJECTION INTRAMUSCULAR; INTRAVENOUS at 16:08

## 2023-09-08 RX ADMIN — DEXAMETHASONE SODIUM PHOSPHATE 4 MG: 4 INJECTION, SOLUTION INTRA-ARTICULAR; INTRALESIONAL; INTRAMUSCULAR; INTRAVENOUS; SOFT TISSUE at 16:07

## 2023-09-08 RX ADMIN — ROCURONIUM BROMIDE 40 MG: 50 INJECTION, SOLUTION INTRAVENOUS at 15:02

## 2023-09-08 RX ADMIN — PHENYLEPHRINE HYDROCHLORIDE 100 MCG: 10 INJECTION INTRAVENOUS at 15:31

## 2023-09-08 RX ADMIN — SODIUM CHLORIDE, POTASSIUM CHLORIDE, SODIUM LACTATE AND CALCIUM CHLORIDE: 600; 310; 30; 20 INJECTION, SOLUTION INTRAVENOUS at 21:28

## 2023-09-08 RX ADMIN — PHENYLEPHRINE HYDROCHLORIDE 100 MCG: 10 INJECTION INTRAVENOUS at 15:22

## 2023-09-08 RX ADMIN — LIDOCAINE HYDROCHLORIDE 40 MG: 20 INJECTION, SOLUTION INFILTRATION; PERINEURAL at 15:02

## 2023-09-08 RX ADMIN — FENTANYL CITRATE 100 MCG: 50 INJECTION, SOLUTION INTRAMUSCULAR; INTRAVENOUS at 15:02

## 2023-09-08 RX ADMIN — NEOSTIGMINE METHYLSULFATE 4 MG: 1 INJECTION, SOLUTION INTRAVENOUS at 15:57

## 2023-09-08 RX ADMIN — SODIUM CHLORIDE, POTASSIUM CHLORIDE, SODIUM LACTATE AND CALCIUM CHLORIDE: 600; 310; 30; 20 INJECTION, SOLUTION INTRAVENOUS at 14:48

## 2023-09-08 RX ADMIN — SODIUM CHLORIDE, POTASSIUM CHLORIDE, SODIUM LACTATE AND CALCIUM CHLORIDE: 600; 310; 30; 20 INJECTION, SOLUTION INTRAVENOUS at 14:52

## 2023-09-08 RX ADMIN — GADOBUTROL 9 ML: 604.72 INJECTION INTRAVENOUS at 09:32

## 2023-09-08 RX ADMIN — PROPOFOL 160 MG: 10 INJECTION, EMULSION INTRAVENOUS at 15:02

## 2023-09-08 RX ADMIN — SODIUM CHLORIDE, POTASSIUM CHLORIDE, SODIUM LACTATE AND CALCIUM CHLORIDE: 600; 310; 30; 20 INJECTION, SOLUTION INTRAVENOUS at 05:19

## 2023-09-08 RX ADMIN — SODIUM CHLORIDE, POTASSIUM CHLORIDE, SODIUM LACTATE AND CALCIUM CHLORIDE: 600; 310; 30; 20 INJECTION, SOLUTION INTRAVENOUS at 13:58

## 2023-09-08 ASSESSMENT — ACTIVITIES OF DAILY LIVING (ADL)
ADLS_ACUITY_SCORE: 20
ADLS_ACUITY_SCORE: 37
ADLS_ACUITY_SCORE: 20

## 2023-09-08 ASSESSMENT — ENCOUNTER SYMPTOMS: DYSRHYTHMIAS: 1

## 2023-09-08 NOTE — PROGRESS NOTES
[facility]    Medicine Progress Note - Hospitalist Service    Date of Admission:  9/7/2023    Assessment & Plan   Kendell Caban is a 62 year old male with history of nephrolithiasis, depression admitted on 9/7/2023 with abdominal pain. Found to have gallstone pancreatitis      Abdominal pain  Cholelithiasis  Gallstone pancreatitis    - US showed cholelithiasis, no cholecystitis or duct dilation    - MRCP  to be done this am    - LFTs stable    - no role for trending lipase     - GI following (ERCP need to be determined after MRCP)    - +/- surgery consult inpatient vs outpatient depending on MRCP findings    - no pain this am, has PRN meds ordered      Reflux symptoms    - has had chronic reflux symptoms    - EGD as outpt if no need for ERCP here    - on IV protonix (can discharge with oral PPI)    New onset atrial fibrillation, likely paroxysmal    - found to be in a fib in ED    - fairly rate controlled    - hold new order for metoprolol due to soft BPs    - tele    - TSH normal    - monitor to see if he converts back on his own    - if he remains in a fib, then will need cardioversion in a month and will need to be started on a DOAC prior to discharge    - +/- zio on discharge    - caffeine consumption contributing (drinks many Mountain Dews). Patient counseled on moderation    History of depression    - remote, not currently on any medications     History renal stone/ genital herpes    - stable     Daughter and girlfriend updated     Diet: NPO for Medical/Clinical Reasons Except for: Meds    DVT Prophylaxis: Low Risk/Ambulatory with no VTE prophylaxis indicated  Galloway Catheter: Not present  Lines: None     Cardiac Monitoring: ACTIVE order. Indication: Tachyarrhythmias, acute (48 hours)  Code Status: Full Code      Clinically Significant Risk Factors Present on Admission           # Hypercalcemia: Highest Ca = 10.7 mg/dL in last 2 days, will monitor as appropriate             #  "Overweight: Estimated body mass index is 27.96 kg/m  as calculated from the following:    Height as of this encounter: 1.803 m (5' 11\").    Weight as of this encounter: 90.9 kg (200 lb 8 oz).              Disposition Plan      Expected Discharge Date: 09/09/2023                  Robert Das MD  Hospitalist Service  [facility]  Securely message with [employer] (more info)  Text page via [employer] Paging/Directory   ______________________________________________________________________    Interval History   Patient states his pain is gone. No chest pain, abdo pain, sob. No new complaints.    Physical Exam   Vital Signs: Temp: 98.6  F (37  C) Temp src: Oral BP: 98/73 Pulse: 83   Resp: 20 SpO2: 96 % O2 Device: None (Room air)    Weight: 200 lbs 8 oz    Constitutional: awake, alert, cooperative, no apparent distress, and appears stated age  Eyes: Lids and lashes normal, pupils equal, round and reactive to light, extra ocular muscles intact, sclera clear, conjunctiva normal  ENT: Normocephalic, without obvious abnormality, atraumatic, sinuses nontender on palpation, external ears without lesions, oral pharynx with moist mucous membranes, tonsils without erythema or exudates, gums normal and good dentition.  Respiratory: No increased work of breathing, good air exchange, clear to auscultation bilaterally, no crackles or wheezing  Cardiovascular: S1S2 irreg irreg, normal rate  GI: No scars, normal bowel sounds, soft, non-distended, non-tender, no masses palpated, no hepatosplenomegally    Medical Decision Making       40 MINUTES SPENT BY ME on the date of service doing chart review, history, exam, documentation & further activities per the note.      Data     I have personally reviewed the following data over the past 24 hrs:    10.4  \   14.6   / 227     140 104 12.9 /  110 (H)   3.9 28 1.19 (H) \     ALT: 383 (H) AST: 316 (H) AP: 151 (H) TBILI: 5.0 (H)   ALB: 3.6 TOT PROTEIN: 6.0 (L) LIPASE: 648 (H) "     Trop: 6 BNP: N/A     TSH: 1.89 T4: N/A A1C: N/A     INR:  1.07 PTT:  24   D-dimer:  N/A Fibrinogen:  N/A       Imaging results reviewed over the past 24 hrs:   Recent Results (from the past 24 hour(s))   Abdomen US, limited (RUQ only)    Narrative    US ABDOMEN LIMITED 9/7/2023 4:23 PM    CLINICAL HISTORY: eval for elevated lfts,   TECHNIQUE: Limited abdominal ultrasound.    COMPARISON: CT 3/13/2016    FINDINGS:    GALLBLADDER: Multiple stones within a nondistended gallbladder. No  definite wall thickening or pericholecystic fluid. Sonographic Randhawa  sign is negative.    BILE DUCTS: There is no intrahepatic biliary dilatation. The common  duct measures 5 mm.    LIVER: Diffusely increased echogenicity with decreased conspicuity of  portal triads.    RIGHT KIDNEY: No hydronephrosis.    PANCREAS: The pancreas is largely obscured by overlying gas.    No ascites.      Impression    IMPRESSION:  1.  Cholelithiasis without definite sonographic evidence of acute  cholecystitis or biliary obstruction.  2.  Hepatic steatosis.    SHELLEY MOHAN MD         SYSTEM ID:  [id number]

## 2023-09-08 NOTE — ANESTHESIA CARE TRANSFER NOTE
Patient: Kendell Caban    Procedure: Procedure(s):  ENDOSCOPIC RETROGRADE CHOLANGIOPANCREATOGRAPHY       Diagnosis: Gall stone pancreatitis [K85.10]  Diagnosis Additional Information: No value filed.    Anesthesia Type:   General     Note:    Oropharynx: spontaneously breathing  Level of Consciousness: awake  Oxygen Supplementation: face mask    Independent Airway: airway patency satisfactory and stable  Dentition: dentition unchanged  Vital Signs Stable: post-procedure vital signs reviewed and stable  Report to RN Given: handoff report given  Patient transferred to: PACU  Comments: To PACU, report to RN, oxygen per face mask.        Vitals:  Vitals Value Taken Time   /72 09/08/23 1615   Temp     Pulse 72 09/08/23 1617   Resp 18 09/08/23 1617   SpO2 94 % 09/08/23 1620   Vitals shown include unvalidated device data.    Electronically Signed By: CARLOTA John CRNA  September 8, 2023  4:21 PM

## 2023-09-08 NOTE — ED NOTES
"[facility]  ED Nurse Handoff Report    ED Chief complaint: Abdominal Pain  . ED Diagnosis:   Final diagnoses:   New onset atrial fibrillation (H)   Gall stone pancreatitis       Allergies:   Allergies   Allergen Reactions    No Known Drug Allergy        Code Status: Full Code    Activity level - Baseline/Home:  independent.  Activity Level - Current:   independent.   Lift room needed: No.   Bariatric: No   Needed: No   Isolation: No.   Infection: Not Applicable.     Respiratory status: Room air    Vital Signs (within 30 minutes):   Vitals:    09/07/23 1517 09/07/23 1518 09/07/23 1520 09/07/23 1521   BP:       Pulse: 101 98 108 112   Resp: 23 19 20 13   Temp:       TempSrc:       SpO2: 96% 96% 95% 95%   Weight:           Cardiac Rhythm:  ,      Pain level:    Patient confused: No.   Patient Falls Risk:  NA .   Elimination Status:  Has not needed to void.       Patient Report - Per provider note:  Kendell Caban is a 62 year old male who presents with chest pain.  Patient is a 62-year-old male who was seen in July for chest pain serial troponins and EKG were normal.  Patient does have a history of reflux.  Was eating Cheerios today and developed chest pain.  He feels like things get stuck when he eats.  He has been worried about reflux.  He denies change with respiration or position but nothing gets better and is uncomfortable with it.  He does feel short of breath with it.  He has had no fever.  Denies alcohol use.  Does admit to one episode of gallstones but states the \"gallstone went away\".     Focused Assessment:      Abnormal Results:   Labs Ordered and Resulted from Time of ED Arrival to Time of ED Departure   BASIC METABOLIC PANEL - Abnormal       Result Value    Sodium 139      Potassium 4.4      Chloride 102      Carbon Dioxide (CO2) 28      Anion Gap 9      Urea Nitrogen 14.0      Creatinine 1.01      Calcium 10.7 (*)     Glucose 136 (*)     GFR Estimate 85     CBC WITH " PLATELETS AND DIFFERENTIAL - Abnormal    WBC Count 13.1 (*)     RBC Count 5.02      Hemoglobin 16.1      Hematocrit 45.8      MCV 91      MCH 32.1      MCHC 35.2      RDW 12.4      Platelet Count 249      % Neutrophils 74      % Lymphocytes 14      % Monocytes 9      % Eosinophils 2      % Basophils 0      % Immature Granulocytes 1      NRBCs per 100 WBC 0      Absolute Neutrophils 9.9 (*)     Absolute Lymphocytes 1.8      Absolute Monocytes 1.1      Absolute Eosinophils 0.2      Absolute Basophils 0.1      Absolute Immature Granulocytes 0.1      Absolute NRBCs 0.0     HEPATIC FUNCTION PANEL - Abnormal    Protein Total 7.7      Albumin 4.3      Bilirubin Total 2.3 (*)     Alkaline Phosphatase 150 (*)      (*)      (*)     Bilirubin Direct 1.32 (*)    LIPASE - Abnormal    Lipase >3,000 (*)    TROPONIN T, HIGH SENSITIVITY - Normal    Troponin T, High Sensitivity 6     PARTIAL THROMBOPLASTIN TIME        Abdomen US, limited (RUQ only)   Final Result   IMPRESSION:   1.  Cholelithiasis without definite sonographic evidence of acute   cholecystitis or biliary obstruction.   2.  Hepatic steatosis.      SHELLEY MOHAN MD            SYSTEM ID:  [id number]      Echocardiogram Complete    (Results Pending)       Treatments provided: Pain meds/ ABX  Family Comments: Wife at bedside.   OBS brochure/video discussed/provided to patient:  N/A  ED Medications:   Medications   piperacillin-tazobactam (ZOSYN) 3.375 g vial to attach to  mL bag (3.375 g Intravenous $New Bag 9/7/23 1900)   HYDROmorphone (DILAUDID) injection 0.2-0.4 mg (has no administration in time range)   lactated ringers infusion (has no administration in time range)   pantoprazole (PROTONIX) IV push injection 40 mg (has no administration in time range)   lidocaine 1 % 0.1-1 mL (has no administration in time range)   lidocaine (LMX4) cream (has no administration in time range)   sodium chloride (PF) 0.9% PF flush 3 mL (has no administration in time  range)   sodium chloride (PF) 0.9% PF flush 3 mL (has no administration in time range)   melatonin tablet 1 mg (has no administration in time range)   metoprolol tartrate (LOPRESSOR) tablet 25 mg (has no administration in time range)   ketorolac (TORADOL) injection 15 mg (15 mg Intravenous $Given 9/7/23 1456)   magic mouthwash suspension (diphenhydramine, lidocaine, aluminum-magnesium & simethicone) (10 mLs Oral $Given 9/7/23 1513)   HYDROmorphone (PF) (DILAUDID) injection 0.5 mg (0.5 mg Intravenous $Given 9/7/23 3736)       Drips infusing:  No  For the majority of the shift this patient was Green.   Interventions performed were .    Sepsis treatment initiated: No    Cares/treatment/interventions/medications to be completed following ED care:     ED Nurse Name: Koko Quevedo RN  7:15 PM   RECEIVING UNIT ED HANDOFF REVIEW    Above ED Nurse Handoff Report was reviewed: Yes  Reviewed by: Stefani Odonnell RN on September 8, 2023 at 12:20 AM

## 2023-09-08 NOTE — H&P (VIEW-ONLY)
GASTROENTEROLOGY CONSULTATION      Kendell Caban  [address]  [facility] [address]  62 year old male     Admission Date/Time: 9/7/2023  Primary Care Provider: No Ref-Primary, Physician     We were asked to see the patient in consultation by Dr. Martinez for evaluation of gall stone pancreatitis.    CC: epigastric pain     HPI:  Kendell Caban is a 62 year old male with past medical history significant for kidney stones, depression, GERD, admitted 9/7 with epigastric/chest pain and elevated lipase, liver enzymes, bilirubin, and ultrasound positive for gall stones, normal bile ducts concerning for gall stone pancreatitis.     Patient developed severe epigastric/chest pain yesterday morning after eating. He mentions it was difficult for him to swallow the food as well. He thought it was initially reflux as he had an issue similar in 2019 which resolved with antacids. At that time they considered esophageal dilation but antacids resolved the problem and he is only taking tums or rolaids as needed at this point. He did not have recurrence of dysphagia since 2019 until yesterday. The pain has improved since admission. He reports associated nausea but denies vomiting, fevers, chills, dark urine, light colored stools. He does not drink alcohol or smoke. No new meds. He had a similar episode ~5 weeks ago that he thought was cardiac. He was evaluated and cardiac workup was reportedly unremarkable.     Labs on admission showed an elevated white blood cell count of 13.1, otherwise normal CBC, elevated lipase greater than 3000, total bilirubin 2.3, direct bilirubin 1.32, , , alk phos 150, calcium 10.7, otherwise normal creatinine and electrolytes.  Repeat labs this morning show improving lipase to 648 but worsening bilirubin to 5, improving AST to 316, increased ALT to 383, stable alkaline phosphatase at 151.  White blood cell count normalized.    Right upper quadrant ultrasound showed evidence of  "multiple stones within the gallbladder, no evidence of cholecystitis, common duct normal measuring 5 mm, diffuse increased echogenicity of the liver consistent with hepatic steatosis.    MRCP has been ordered, result pending.     PAST MEDICAL HISTORY:  Patient Active Problem List    Diagnosis Date Noted    New onset atrial fibrillation (H) 09/07/2023     Priority: Medium    Gall stone pancreatitis 09/07/2023     Priority: Medium    Renal colic 03/24/2016     Priority: Medium    Genital herpes 06/08/1999     Priority: Medium     Problem list name updated by automated process. Provider to review            ROS: A comprehensive ten point review of systems was negative aside from those in mentioned in the HPI.       MEDICATIONS:   Prior to Admission medications    Not on File        ALLERGIES:   Allergies   Allergen Reactions    No Known Drug Allergy         SOCIAL HISTORY:  Social History     Tobacco Use    Smoking status: Never   Substance Use Topics    Alcohol use: No    Drug use: No     Comment: drinks quite a bit of caffeine        FAMILY HISTORY:  Family History   Problem Relation Age of Onset    Alzheimer Disease No family hx of     Cancer Paternal Aunt         colon    Cancer Paternal Aunt         coloc    Diabetes No family hx of     Heart Disease No family hx of     Cerebrovascular Disease Brother         age 40    Cancer Father     Cancer Paternal Grandfather         PHYSICAL EXAM:   BP 98/73 (BP Location: Right arm)   Pulse 83   Temp 98.6  F (37  C) (Oral)   Resp 20   Ht 1.803 m (5' 11\")   Wt 90.9 kg (200 lb 8 oz)   SpO2 96%   BMI 27.96 kg/m       PHYSICAL EXAM:  General: alert, oriented, NAD  SKIN: no suspicious lesions, rashes, jaundice, or spider angiomas  HEAD: Normocephalic. No masses, lesions, tenderness or abnormalities  NECK: Neck supple. No adenopathy. Thyroid symmetric, normal size.  EYES: No scleral icterus  ENT: ENT exam normal, no neck nodes or sinus tenderness  RESPIRATORY: negative, " Good diaphragmatic excursion. Lungs clear  CARDIOVASCULAR: negative, PMI normal. No lifts, heaves, or thrills. RRR. No murmurs, clicks gallops or rub  GASTROINTESTINAL: +BS, soft, minimal epigastric tenderness, ND, no HSM, no masses/guarding/rebound  JOINT/EXTREMITIES: extremities normal- no gross deformities noted, gait normal and normal muscle tone  NEURO: Reflexes grossly normal and symmetric. Sensation grossly WNL.  PSYCH: no abnormal anxiety/depression  LYMPH: No anterior cervical, posterior cervical, or supraclavicular adenopathy     LABS:  I reviewed the patient's new clinical lab test results.   Recent Labs   Lab Test 09/08/23  0549 09/07/23  1446 07/26/23  1639   WBC 10.4 13.1* 9.1   HGB 14.6 16.1 14.8   MCV 91 91 92    249 226   INR 1.07  --   --      Recent Labs   Lab Test 09/08/23  0549 09/07/23  1446 07/26/23  1639    139 136   POTASSIUM 3.9 4.4 4.0   CHLORIDE 104 102 102   CO2 28 28 24   BUN 12.9 14.0 13.9   ANIONGAP 8 9 10   NUBIA 9.4 10.7* 9.4     Recent Labs   Lab Test 09/08/23  0549 09/07/23  1446 03/24/16  1138 03/13/16  0957   ALBUMIN 3.6 4.3  --   --    BILITOTAL 5.0* 2.3*  --   --    * 266*  --   --    * 379*  --   --    ALKPHOS 151* 150*  --   --    PROTEIN  --   --  10* 10*   LIPASE 648* >3,000*  --   --         IMAGING  I personally reviewed the patient's new imaging results.    US ABDOMEN LIMITED 9/7/2023 4:23 PM     CLINICAL HISTORY: eval for elevated lfts,   TECHNIQUE: Limited abdominal ultrasound.     COMPARISON: CT 3/13/2016     FINDINGS:     GALLBLADDER: Multiple stones within a nondistended gallbladder. No  definite wall thickening or pericholecystic fluid. Sonographic Randhawa  sign is negative.     BILE DUCTS: There is no intrahepatic biliary dilatation. The common  duct measures 5 mm.     LIVER: Diffusely increased echogenicity with decreased conspicuity of  portal triads.     RIGHT KIDNEY: No hydronephrosis.     PANCREAS: The pancreas is largely obscured by  overlying gas.     No ascites.                                                                      IMPRESSION:  1.  Cholelithiasis without definite sonographic evidence of acute  cholecystitis or biliary obstruction.  2.  Hepatic steatosis.     CONSULTATION ASSESSMENT AND PLAN:    62 year old male with past medical history significant for kidney stones, depression, GERD, admitted 9/7 with epigastric/chest pain and elevated lipase, liver enzymes, bilirubin, and ultrasound positive for gall stones, normal bile ducts concerning for gall stone pancreatitis.     1. Acute pancreatitis. Most consistent with gall stone pancreatitis. He has had a significant drop in his lipase from >3000 to 648, ducts are normal on ultrasound and he is clinically better today possibly indicating stone may have passed spontaneously. However, total bilirubin and AST have increased and no improvement in alk  phos making persistent choledocholithiasis possible.   --MRCP ordered to further evaluate for choledocholithiasis.   --If positive, would need ERCP. If negative, then would treat clinically for pancreatitis, monitor LFTs/bili, and consult surgery for consideration of cholecystectomy.     Reviewed with Dr. Payne. Updated Dr. Das from hospitalist service and attending GI MD Dr. Harrison.      Total time spent:  50 minutes was spent providing patient care, including patient evaluation, reviewing documentation/test results, and . Thank you for asking us to participate in the care of this patient.      Sridevi Toure, [facility] ([facility])

## 2023-09-08 NOTE — PROGRESS NOTES
GI Update:    MRCP shows possible filling defect in the common bile duct, questionable for persistent choledocholithiasis. ERCP added for this afternoon. Keep NPO.     MEREDITH Roberts  [facility] ([facility])

## 2023-09-08 NOTE — PLAN OF CARE
"PRIMARY DIAGNOSIS: \"GALLSTONE/PANCREATITIS + AFIB\"  OUTPATIENT/OBSERVATION GOALS TO BE MET BEFORE DISCHARGE:  ADLs back to baseline: Yes    Activity and level of assistance: Ambulating independently.    Pain status: Pain free.    Return to near baseline physical activity: No     Discharge Planner Nurse   Safe discharge environment identified: Yes  Barriers to discharge: Yes       Entered by: Brenda Gonzalez RN 09/08/2023 1:39 PM    /71 (BP Location: Right arm)   Pulse 85   Temp 98.6  F (37  C) (Oral)   Resp 20   Ht 1.803 m (5' 11\")   Wt 90.9 kg (200 lb 8 oz)   SpO2 95%   BMI 27.96 kg/m       A&O x4, Ambulating independently. NPO all day for procedures: MRCP and ERCP. Denies any pain. Has continuous LR, tele- Afib at 88 bpm. Plan for cholecystectomy.     Please review provider order for any additional goals.   Nurse to notify provider when observation goals have been met and patient is ready for discharge.Goal Outcome Evaluation:                        "

## 2023-09-08 NOTE — ANESTHESIA PREPROCEDURE EVALUATION
Anesthesia Pre-Procedure Evaluation    Patient: Kendell Caban   MRN: [id number] : 1960        Procedure : Procedure(s):  ENDOSCOPIC RETROGRADE CHOLANGIOPANCREATOGRAPHY          Past Medical History:   Diagnosis Date     Cholelithiasis      Depression      Kidney stone      Paroxysmal A-fib (H) 2023     Unspecified viral hepatitis without mention of hepatic coma     as infant      Past Surgical History:   Procedure Laterality Date     COMBINED CYSTOSCOPY, RETROGRADES, URETEROSCOPY, LASER HOLMIUM LITHOTRIPSY URETER(S), INSERT STENT Left 3/25/2016    Procedure: COMBINED CYSTOSCOPY, RETROGRADES, URETEROSCOPY, LASER HOLMIUM LITHOTRIPSY URETER(S), INSERT STENT;  Surgeon: Gen Funes MD;  Location: [facility] OR     REMV PILONIDAL LESION SIMPLE        Allergies   Allergen Reactions     No Known Drug Allergy       Social History     Tobacco Use     Smoking status: Never     Smokeless tobacco: Not on file   Substance Use Topics     Alcohol use: No      Wt Readings from Last 1 Encounters:   23 90.9 kg (200 lb 8 oz)        Anesthesia Evaluation            ROS/MED HX  ENT/Pulmonary:  - neg pulmonary ROS     Neurologic:  - neg neurologic ROS     Cardiovascular:     (+)  - -   -  - -                        dysrhythmias, a-fib,             METS/Exercise Tolerance: >4 METS    Hematologic:  - neg hematologic  ROS     Musculoskeletal:  - neg musculoskeletal ROS     GI/Hepatic:  - neg GI/hepatic ROS   (+) GERD, Asymptomatic on medication,           liver disease,       Renal/Genitourinary:  - neg Renal ROS     Endo:  - neg endo ROS     Psychiatric/Substance Use:  - neg psychiatric ROS     Infectious Disease:  - neg infectious disease ROS     Malignancy:  - neg malignancy ROS     Other:  - neg other ROS          Physical Exam    Airway        Mallampati: I   TM distance: > 3 FB   Neck ROM: full   Mouth opening: > 3 cm    Respiratory Devices and Support         Dental       (+) Completely normal  teeth      Cardiovascular   cardiovascular exam normal       Rhythm and rate: irregular and normal     Pulmonary   pulmonary exam normal        breath sounds clear to auscultation       OUTSIDE LABS:  CBC:   Lab Results   Component Value Date    WBC 10.4 09/08/2023    WBC 13.1 (H) 09/07/2023    HGB 14.6 09/08/2023    HGB 16.1 09/07/2023    HCT 41.2 09/08/2023    HCT 45.8 09/07/2023     09/08/2023     09/07/2023     BMP:   Lab Results   Component Value Date     09/08/2023     09/07/2023    POTASSIUM 3.9 09/08/2023    POTASSIUM 4.4 09/07/2023    CHLORIDE 104 09/08/2023    CHLORIDE 102 09/07/2023    CO2 28 09/08/2023    CO2 28 09/07/2023    BUN 12.9 09/08/2023    BUN 14.0 09/07/2023    CR 1.19 (H) 09/08/2023    CR 1.01 09/07/2023    GLC 91 09/08/2023     (H) 09/08/2023     COAGS:   Lab Results   Component Value Date    PTT 24 09/07/2023    INR 1.07 09/08/2023     POC: No results found for: BGM, HCG, HCGS  HEPATIC:   Lab Results   Component Value Date    ALBUMIN 3.6 09/08/2023    PROTTOTAL 6.0 (L) 09/08/2023     (H) 09/08/2023     (H) 09/08/2023    ALKPHOS 151 (H) 09/08/2023    BILITOTAL 5.0 (H) 09/08/2023     OTHER:   Lab Results   Component Value Date    NUBIA 9.4 09/08/2023    LIPASE 648 (H) 09/08/2023    TSH 1.89 09/08/2023       Anesthesia Plan    ASA Status:  3       Anesthesia Type: General.     - Airway: ETT   Induction: Intravenous.   Maintenance: Inhalation.        Consents    Anesthesia Plan(s) and associated risks, benefits, and realistic alternatives discussed. Questions answered and patient/representative(s) expressed understanding.     - Discussed: Risks, Benefits and Alternatives for the PROCEDURE were discussed     - Discussed with:  Patient       Use of blood products discussed: Yes.     - Discussed with: Patient.     - Consented: consented to blood products            Reason for refusal: other.     Postoperative Care    Pain management: IV analgesics, Oral  pain medications.   PONV prophylaxis: Ondansetron (or other 5HT-3), Dexamethasone or Solumedrol     Comments:              Netta Navarro MD

## 2023-09-08 NOTE — PROGRESS NOTES
ROOM # 208-1    Living Situation: Home with sig other  Facility name:  : [facility]- [phone]    Activity level at baseline: Ind  Activity level on admit: SBA    Who will be transporting you at discharge: [facility]    Patient registered to observation; given Patient Bill of Rights; given the opportunity to ask questions about observation status and their plan of care.  Patient has been oriented to the observation room, bathroom and call light is in place.    Discussed discharge goals and expectations with patient/family.

## 2023-09-08 NOTE — PLAN OF CARE
"PRIMARY DIAGNOSIS: Gallstone pancreatitis     Care from 6300-2181    Inpatient Progress Note:  For complete assessment see flow sheet documentation.    /69 (BP Location: Right arm)   Pulse 70   Temp 98.1  F (36.7  C) (Oral)   Resp 18   Ht 1.803 m (5' 11\")   Wt 90.9 kg (200 lb 8 oz)   SpO2 94%   BMI 27.96 kg/m         Orientation: A&O x 4  Neuro: Intact  Pain status: Pt is denying pain at this time. Has dilaudid PRN.  Activity: SBA, pt says he feels fine to walk around but was told to call if getting up.  Peripheral edema: N/A  Resp: Pt has no SOB and breathing is regular.   Cardiac: Pt has a irregular apical pulse. Pt has been in A- fib. 03:26 tele reported a-fib 69.  GI: Pt is complaining of chest pain which he attributes to acid reflex. Abdomen is soft and non distended. Bowel sounds are active.   : Pt is voiding spontaneously and is continent.  Skin: Pt has bites on his while mowing the lawn. Attributes them to biting flies.   LDA: PIV in LAC  Infusions: Pt has lactated ringers running 100 mL/hr  Pertinent Labs: Lipase- >3000, bilirubin- 2.3, AST/ALT- 379/266  Diet: NPO  Consults: Pt has consult for GI on 9/8 to decide course of action   Discharge Plan: Pt is to have GI consult, may consult surgery if GI indicates this is needed. Start on beta blocker, continue to monitor telemetry.        "

## 2023-09-08 NOTE — PLAN OF CARE
[facility]    ED Boarding Nurse Handoff Addendum Report:    Date/time: 9/7/2023, 7:59 PM    Activity Level: standby    Fall Risk: Yes:  nonskid shoes/slippers when out of bed, arm band in place, patient and family education, assistive device/personal items within reach, activity supervised, and room door open    Active Infusions: LR 100mL/hr    Current Meds Due: See MAR    Current care needs: Pain management    Oxygen requirements (liters/min and/or FiO2): None    Respiratory status: Room air    Vital signs (within last 30 minutes):    Vitals:    09/07/23 1518 09/07/23 1520 09/07/23 1521 09/07/23 1951   BP:    124/89   BP Location:    Right arm   Pulse: 98 108 112 99   Resp: 19 20 13    Temp:       TempSrc:       SpO2: 96% 95% 95%    Weight:           Focused assessment within last 30 minutes:    A&Ox4. SBA when OOB. PIV in L arm is infusing LR 100mL/hr. NPO at midnight. On cardiac monitoring. No reports of pain at this time. A fib education done.     ED Boarding Nurse name: Jerilyn Schultz RN

## 2023-09-08 NOTE — CONSULTS
GASTROENTEROLOGY CONSULTATION      Kendell Caban  [address]  [facility] [address]  62 year old male     Admission Date/Time: 9/7/2023  Primary Care Provider: No Ref-Primary, Physician     We were asked to see the patient in consultation by Dr. Martinez for evaluation of gall stone pancreatitis.    CC: epigastric pain     HPI:  Kendell Caban is a 62 year old male with past medical history significant for kidney stones, depression, GERD, admitted 9/7 with epigastric/chest pain and elevated lipase, liver enzymes, bilirubin, and ultrasound positive for gall stones, normal bile ducts concerning for gall stone pancreatitis.     Patient developed severe epigastric/chest pain yesterday morning after eating. He mentions it was difficult for him to swallow the food as well. He thought it was initially reflux as he had an issue similar in 2019 which resolved with antacids. At that time they considered esophageal dilation but antacids resolved the problem and he is only taking tums or rolaids as needed at this point. He did not have recurrence of dysphagia since 2019 until yesterday. The pain has improved since admission. He reports associated nausea but denies vomiting, fevers, chills, dark urine, light colored stools. He does not drink alcohol or smoke. No new meds. He had a similar episode ~5 weeks ago that he thought was cardiac. He was evaluated and cardiac workup was reportedly unremarkable.     Labs on admission showed an elevated white blood cell count of 13.1, otherwise normal CBC, elevated lipase greater than 3000, total bilirubin 2.3, direct bilirubin 1.32, , , alk phos 150, calcium 10.7, otherwise normal creatinine and electrolytes.  Repeat labs this morning show improving lipase to 648 but worsening bilirubin to 5, improving AST to 316, increased ALT to 383, stable alkaline phosphatase at 151.  White blood cell count normalized.    Right upper quadrant ultrasound showed evidence of  "multiple stones within the gallbladder, no evidence of cholecystitis, common duct normal measuring 5 mm, diffuse increased echogenicity of the liver consistent with hepatic steatosis.    MRCP has been ordered, result pending.     PAST MEDICAL HISTORY:  Patient Active Problem List    Diagnosis Date Noted    New onset atrial fibrillation (H) 09/07/2023     Priority: Medium    Gall stone pancreatitis 09/07/2023     Priority: Medium    Renal colic 03/24/2016     Priority: Medium    Genital herpes 06/08/1999     Priority: Medium     Problem list name updated by automated process. Provider to review            ROS: A comprehensive ten point review of systems was negative aside from those in mentioned in the HPI.       MEDICATIONS:   Prior to Admission medications    Not on File        ALLERGIES:   Allergies   Allergen Reactions    No Known Drug Allergy         SOCIAL HISTORY:  Social History     Tobacco Use    Smoking status: Never   Substance Use Topics    Alcohol use: No    Drug use: No     Comment: drinks quite a bit of caffeine        FAMILY HISTORY:  Family History   Problem Relation Age of Onset    Alzheimer Disease No family hx of     Cancer Paternal Aunt         colon    Cancer Paternal Aunt         coloc    Diabetes No family hx of     Heart Disease No family hx of     Cerebrovascular Disease Brother         age 40    Cancer Father     Cancer Paternal Grandfather         PHYSICAL EXAM:   BP 98/73 (BP Location: Right arm)   Pulse 83   Temp 98.6  F (37  C) (Oral)   Resp 20   Ht 1.803 m (5' 11\")   Wt 90.9 kg (200 lb 8 oz)   SpO2 96%   BMI 27.96 kg/m       PHYSICAL EXAM:  General: alert, oriented, NAD  SKIN: no suspicious lesions, rashes, jaundice, or spider angiomas  HEAD: Normocephalic. No masses, lesions, tenderness or abnormalities  NECK: Neck supple. No adenopathy. Thyroid symmetric, normal size.  EYES: No scleral icterus  ENT: ENT exam normal, no neck nodes or sinus tenderness  RESPIRATORY: negative, " Good diaphragmatic excursion. Lungs clear  CARDIOVASCULAR: negative, PMI normal. No lifts, heaves, or thrills. RRR. No murmurs, clicks gallops or rub  GASTROINTESTINAL: +BS, soft, minimal epigastric tenderness, ND, no HSM, no masses/guarding/rebound  JOINT/EXTREMITIES: extremities normal- no gross deformities noted, gait normal and normal muscle tone  NEURO: Reflexes grossly normal and symmetric. Sensation grossly WNL.  PSYCH: no abnormal anxiety/depression  LYMPH: No anterior cervical, posterior cervical, or supraclavicular adenopathy     LABS:  I reviewed the patient's new clinical lab test results.   Recent Labs   Lab Test 09/08/23  0549 09/07/23  1446 07/26/23  1639   WBC 10.4 13.1* 9.1   HGB 14.6 16.1 14.8   MCV 91 91 92    249 226   INR 1.07  --   --      Recent Labs   Lab Test 09/08/23  0549 09/07/23  1446 07/26/23  1639    139 136   POTASSIUM 3.9 4.4 4.0   CHLORIDE 104 102 102   CO2 28 28 24   BUN 12.9 14.0 13.9   ANIONGAP 8 9 10   NUBIA 9.4 10.7* 9.4     Recent Labs   Lab Test 09/08/23  0549 09/07/23  1446 03/24/16  1138 03/13/16  0957   ALBUMIN 3.6 4.3  --   --    BILITOTAL 5.0* 2.3*  --   --    * 266*  --   --    * 379*  --   --    ALKPHOS 151* 150*  --   --    PROTEIN  --   --  10* 10*   LIPASE 648* >3,000*  --   --         IMAGING  I personally reviewed the patient's new imaging results.    US ABDOMEN LIMITED 9/7/2023 4:23 PM     CLINICAL HISTORY: eval for elevated lfts,   TECHNIQUE: Limited abdominal ultrasound.     COMPARISON: CT 3/13/2016     FINDINGS:     GALLBLADDER: Multiple stones within a nondistended gallbladder. No  definite wall thickening or pericholecystic fluid. Sonographic Randhawa  sign is negative.     BILE DUCTS: There is no intrahepatic biliary dilatation. The common  duct measures 5 mm.     LIVER: Diffusely increased echogenicity with decreased conspicuity of  portal triads.     RIGHT KIDNEY: No hydronephrosis.     PANCREAS: The pancreas is largely obscured by  overlying gas.     No ascites.                                                                      IMPRESSION:  1.  Cholelithiasis without definite sonographic evidence of acute  cholecystitis or biliary obstruction.  2.  Hepatic steatosis.     CONSULTATION ASSESSMENT AND PLAN:    62 year old male with past medical history significant for kidney stones, depression, GERD, admitted 9/7 with epigastric/chest pain and elevated lipase, liver enzymes, bilirubin, and ultrasound positive for gall stones, normal bile ducts concerning for gall stone pancreatitis.     1. Acute pancreatitis. Most consistent with gall stone pancreatitis. He has had a significant drop in his lipase from >3000 to 648, ducts are normal on ultrasound and he is clinically better today possibly indicating stone may have passed spontaneously. However, total bilirubin and AST have increased and no improvement in alk  phos making persistent choledocholithiasis possible.   --MRCP ordered to further evaluate for choledocholithiasis.   --If positive, would need ERCP. If negative, then would treat clinically for pancreatitis, monitor LFTs/bili, and consult surgery for consideration of cholecystectomy.     Reviewed with Dr. Payne. Updated Dr. Das from hospitalist service and attending GI MD Dr. Harrison.      Total time spent:  50 minutes was spent providing patient care, including patient evaluation, reviewing documentation/test results, and . Thank you for asking us to participate in the care of this patient.      Sridevi Toure, [facility] ([facility])

## 2023-09-08 NOTE — UTILIZATION REVIEW
"  Admission Status; Secondary Review Determination         Under the authority of the Utilization Management Committee, the utilization review process indicated a secondary review on the above patient.  The review outcome is based on review of the medical records, discussions with staff, and applying clinical experience noted on the date of the review.        (xxx)      Inpatient Status Appropriate - This patient's medical care is consistent with medical management for inpatient care and reasonable inpatient medical practice.      () Observation Status Appropriate - This patient does not meet hospital inpatient criteria and is placed in observation status. If this patient's primary payer is Medicare and was admitted as an inpatient, Condition Code 44 should be used and patient status changed to \"observation\".   () Admission Status NOT Appropriate - This patient's medical care is not consistent with medical management for Inpatient or Observation Status.          RATIONALE FOR DETERMINATION     62 year old male with pmh of kidney stones and depression who was admitted with abdominal pain on 9/7. He had a lipase of > 3000 on presentation and severe abdominal pain. WBC of 13.1. LFTS included bilirubin of 2.3. He was diagnosed with likely gallstone pancreatitis. Today, he is doing better in terms of pain (while NPO), but his bilirubin remains elevated at 5, much worse than admission. Plan is for MRCP and then potentially ERCP. Given diagnosis of gallstone pancreatitis and need for continued treatment and evaluation agree with inpatient status for further evaluation and treatment given his increased risk of adverse events, and elevating bilirubin.         The severity of illness, intensity of service provided, expected LOS and risk for adverse outcome make the care complex, high risk and appropriate for hospital admission.        The information on this document is developed by the utilization review team in order for " the business office to ensure compliance.  This only denotes the appropriateness of proper admission status and does not reflect the quality of care rendered.         The definitions of Inpatient Status and Observation Status used in making the determination above are those provided in the CMS Coverage Manual, Chapter 1 and Chapter 6, section 70.4.      Sincerely,     Holland Kurtz DO  Physician Advisor  Utilization Review/ Case Management  [facility].

## 2023-09-08 NOTE — ANESTHESIA POSTPROCEDURE EVALUATION
Patient: Kendell Caban    Procedure: Procedure(s):  ENDOSCOPIC RETROGRADE CHOLANGIOPANCREATOGRAPHY       Anesthesia Type:  General    Note:  Disposition: Outpatient   Postop Pain Control: Uneventful            Sign Out: Well controlled pain   PONV: No   Neuro/Psych: Uneventful            Sign Out: Acceptable/Baseline neuro status   Airway/Respiratory: Uneventful            Sign Out: AIRWAY IN SITU/Resp. Support   CV/Hemodynamics: Uneventful            Sign Out: Acceptable CV status; No obvious hypovolemia; No obvious fluid overload   Other NRE: NONE   DID A NON-ROUTINE EVENT OCCUR? No           Last vitals:  Vitals Value Taken Time   /70 09/08/23 1635   Temp 98.5  F (36.9  C) 09/08/23 1610   Pulse 59 09/08/23 1635   Resp 14 09/08/23 1632   SpO2 92 % 09/08/23 1648   Vitals shown include unvalidated device data.    Electronically Signed By: Netta Navarro MD  September 8, 2023  4:50 PM

## 2023-09-09 ENCOUNTER — ANESTHESIA EVENT (OUTPATIENT)
Dept: SURGERY | Facility: CLINIC | Age: 63
DRG: 419 | End: 2023-09-09
Payer: COMMERCIAL

## 2023-09-09 ENCOUNTER — ANESTHESIA (OUTPATIENT)
Dept: SURGERY | Facility: CLINIC | Age: 63
DRG: 419 | End: 2023-09-09
Payer: COMMERCIAL

## 2023-09-09 LAB
ALBUMIN SERPL BCG-MCNC: 3.4 G/DL (ref 3.5–5.2)
ALP SERPL-CCNC: 140 U/L (ref 40–129)
ALT SERPL W P-5'-P-CCNC: 260 U/L (ref 0–70)
ANION GAP SERPL CALCULATED.3IONS-SCNC: 9 MMOL/L (ref 7–15)
AST SERPL W P-5'-P-CCNC: 121 U/L (ref 0–45)
BASOPHILS # BLD AUTO: 0 10E3/UL (ref 0–0.2)
BASOPHILS NFR BLD AUTO: 0 %
BILIRUB DIRECT SERPL-MCNC: 0.43 MG/DL (ref 0–0.3)
BILIRUB SERPL-MCNC: 1.4 MG/DL
BUN SERPL-MCNC: 14.4 MG/DL (ref 8–23)
CALCIUM SERPL-MCNC: 8.9 MG/DL (ref 8.8–10.2)
CHLORIDE SERPL-SCNC: 104 MMOL/L (ref 98–107)
CREAT SERPL-MCNC: 1.01 MG/DL (ref 0.67–1.17)
DEPRECATED HCO3 PLAS-SCNC: 26 MMOL/L (ref 22–29)
EGFRCR SERPLBLD CKD-EPI 2021: 85 ML/MIN/1.73M2
EOSINOPHIL # BLD AUTO: 0 10E3/UL (ref 0–0.7)
EOSINOPHIL NFR BLD AUTO: 0 %
ERYTHROCYTE [DISTWIDTH] IN BLOOD BY AUTOMATED COUNT: 12.6 % (ref 10–15)
GLUCOSE SERPL-MCNC: 137 MG/DL (ref 70–99)
HCT VFR BLD AUTO: 39.2 % (ref 40–53)
HGB BLD-MCNC: 13.3 G/DL (ref 13.3–17.7)
IMM GRANULOCYTES # BLD: 0 10E3/UL
IMM GRANULOCYTES NFR BLD: 0 %
LYMPHOCYTES # BLD AUTO: 1.4 10E3/UL (ref 0.8–5.3)
LYMPHOCYTES NFR BLD AUTO: 14 %
MCH RBC QN AUTO: 31.8 PG (ref 26.5–33)
MCHC RBC AUTO-ENTMCNC: 33.9 G/DL (ref 31.5–36.5)
MCV RBC AUTO: 94 FL (ref 78–100)
MONOCYTES # BLD AUTO: 0.6 10E3/UL (ref 0–1.3)
MONOCYTES NFR BLD AUTO: 6 %
NEUTROPHILS # BLD AUTO: 7.7 10E3/UL (ref 1.6–8.3)
NEUTROPHILS NFR BLD AUTO: 80 %
NRBC # BLD AUTO: 0 10E3/UL
NRBC BLD AUTO-RTO: 0 /100
PLATELET # BLD AUTO: 223 10E3/UL (ref 150–450)
POTASSIUM SERPL-SCNC: 3.9 MMOL/L (ref 3.4–5.3)
PROT SERPL-MCNC: 6.1 G/DL (ref 6.4–8.3)
RBC # BLD AUTO: 4.18 10E6/UL (ref 4.4–5.9)
SODIUM SERPL-SCNC: 139 MMOL/L (ref 136–145)
WBC # BLD AUTO: 9.8 10E3/UL (ref 4–11)

## 2023-09-09 PROCEDURE — 88304 TISSUE EXAM BY PATHOLOGIST: CPT | Mod: 26

## 2023-09-09 PROCEDURE — 258N000003 HC RX IP 258 OP 636: Performed by: SURGERY

## 2023-09-09 PROCEDURE — 370N000017 HC ANESTHESIA TECHNICAL FEE, PER MIN: Performed by: SURGERY

## 2023-09-09 PROCEDURE — 99222 1ST HOSP IP/OBS MODERATE 55: CPT | Performed by: SURGERY

## 2023-09-09 PROCEDURE — 120N000001 HC R&B MED SURG/OB

## 2023-09-09 PROCEDURE — 250N000009 HC RX 250: Performed by: SURGERY

## 2023-09-09 PROCEDURE — 710N000009 HC RECOVERY PHASE 1, LEVEL 1, PER MIN: Performed by: SURGERY

## 2023-09-09 PROCEDURE — 250N000009 HC RX 250: Performed by: NURSE ANESTHETIST, CERTIFIED REGISTERED

## 2023-09-09 PROCEDURE — 258N000003 HC RX IP 258 OP 636: Performed by: NURSE ANESTHETIST, CERTIFIED REGISTERED

## 2023-09-09 PROCEDURE — 258N000003 HC RX IP 258 OP 636: Performed by: ANESTHESIOLOGY

## 2023-09-09 PROCEDURE — C9113 INJ PANTOPRAZOLE SODIUM, VIA: HCPCS | Performed by: SURGERY

## 2023-09-09 PROCEDURE — 250N000011 HC RX IP 250 OP 636: Performed by: SURGERY

## 2023-09-09 PROCEDURE — 360N000076 HC SURGERY LEVEL 3, PER MIN: Performed by: SURGERY

## 2023-09-09 PROCEDURE — 272N000001 HC OR GENERAL SUPPLY STERILE: Performed by: SURGERY

## 2023-09-09 PROCEDURE — 80053 COMPREHEN METABOLIC PANEL: CPT | Performed by: HOSPITALIST

## 2023-09-09 PROCEDURE — 85025 COMPLETE CBC W/AUTO DIFF WBC: CPT | Performed by: HOSPITALIST

## 2023-09-09 PROCEDURE — 99232 SBSQ HOSP IP/OBS MODERATE 35: CPT | Performed by: PHYSICIAN ASSISTANT

## 2023-09-09 PROCEDURE — 999N000141 HC STATISTIC PRE-PROCEDURE NURSING ASSESSMENT: Performed by: SURGERY

## 2023-09-09 PROCEDURE — 88304 TISSUE EXAM BY PATHOLOGIST: CPT | Mod: TC | Performed by: SURGERY

## 2023-09-09 PROCEDURE — 0FT44ZZ RESECTION OF GALLBLADDER, PERCUTANEOUS ENDOSCOPIC APPROACH: ICD-10-PCS | Performed by: SURGERY

## 2023-09-09 PROCEDURE — 250N000013 HC RX MED GY IP 250 OP 250 PS 637: Performed by: SURGERY

## 2023-09-09 PROCEDURE — 250N000025 HC SEVOFLURANE, PER MIN: Performed by: SURGERY

## 2023-09-09 PROCEDURE — 258N000001 HC RX 258: Performed by: SURGERY

## 2023-09-09 PROCEDURE — 47563 LAPARO CHOLECYSTECTOMY/GRAPH: CPT | Mod: AS | Performed by: PHYSICIAN ASSISTANT

## 2023-09-09 PROCEDURE — 47563 LAPARO CHOLECYSTECTOMY/GRAPH: CPT | Performed by: SURGERY

## 2023-09-09 PROCEDURE — 250N000011 HC RX IP 250 OP 636: Performed by: NURSE ANESTHETIST, CERTIFIED REGISTERED

## 2023-09-09 PROCEDURE — 250N000011 HC RX IP 250 OP 636: Mod: JZ | Performed by: INTERNAL MEDICINE

## 2023-09-09 PROCEDURE — 36415 COLL VENOUS BLD VENIPUNCTURE: CPT | Performed by: HOSPITALIST

## 2023-09-09 PROCEDURE — C9113 INJ PANTOPRAZOLE SODIUM, VIA: HCPCS | Mod: JZ | Performed by: INTERNAL MEDICINE

## 2023-09-09 RX ORDER — NALOXONE HYDROCHLORIDE 0.4 MG/ML
0.4 INJECTION, SOLUTION INTRAMUSCULAR; INTRAVENOUS; SUBCUTANEOUS
Status: DISCONTINUED | OUTPATIENT
Start: 2023-09-09 | End: 2023-09-10 | Stop reason: HOSPADM

## 2023-09-09 RX ORDER — FENTANYL CITRATE 50 UG/ML
INJECTION, SOLUTION INTRAMUSCULAR; INTRAVENOUS PRN
Status: DISCONTINUED | OUTPATIENT
Start: 2023-09-09 | End: 2023-09-09

## 2023-09-09 RX ORDER — ONDANSETRON 2 MG/ML
4 INJECTION INTRAMUSCULAR; INTRAVENOUS EVERY 30 MIN PRN
Status: DISCONTINUED | OUTPATIENT
Start: 2023-09-09 | End: 2023-09-09 | Stop reason: HOSPADM

## 2023-09-09 RX ORDER — BUPIVACAINE HYDROCHLORIDE AND EPINEPHRINE 5; 5 MG/ML; UG/ML
INJECTION, SOLUTION PERINEURAL PRN
Status: DISCONTINUED | OUTPATIENT
Start: 2023-09-09 | End: 2023-09-09 | Stop reason: HOSPADM

## 2023-09-09 RX ORDER — LIDOCAINE 40 MG/G
CREAM TOPICAL
Status: DISCONTINUED | OUTPATIENT
Start: 2023-09-09 | End: 2023-09-09 | Stop reason: HOSPADM

## 2023-09-09 RX ORDER — PROCHLORPERAZINE MALEATE 5 MG
10 TABLET ORAL EVERY 6 HOURS PRN
Status: DISCONTINUED | OUTPATIENT
Start: 2023-09-09 | End: 2023-09-10 | Stop reason: HOSPADM

## 2023-09-09 RX ORDER — HYDROMORPHONE HCL IN WATER/PF 6 MG/30 ML
0.2 PATIENT CONTROLLED ANALGESIA SYRINGE INTRAVENOUS EVERY 5 MIN PRN
Status: DISCONTINUED | OUTPATIENT
Start: 2023-09-09 | End: 2023-09-09 | Stop reason: HOSPADM

## 2023-09-09 RX ORDER — POLYETHYLENE GLYCOL 3350 17 G/17G
17 POWDER, FOR SOLUTION ORAL DAILY
Status: DISCONTINUED | OUTPATIENT
Start: 2023-09-10 | End: 2023-09-10 | Stop reason: HOSPADM

## 2023-09-09 RX ORDER — FENTANYL CITRATE 50 UG/ML
50 INJECTION, SOLUTION INTRAMUSCULAR; INTRAVENOUS EVERY 5 MIN PRN
Status: DISCONTINUED | OUTPATIENT
Start: 2023-09-09 | End: 2023-09-09 | Stop reason: HOSPADM

## 2023-09-09 RX ORDER — CEFAZOLIN SODIUM/WATER 2 G/20 ML
2 SYRINGE (ML) INTRAVENOUS SEE ADMIN INSTRUCTIONS
Status: DISCONTINUED | OUTPATIENT
Start: 2023-09-09 | End: 2023-09-09 | Stop reason: HOSPADM

## 2023-09-09 RX ORDER — SODIUM CHLORIDE, SODIUM LACTATE, POTASSIUM CHLORIDE, CALCIUM CHLORIDE 600; 310; 30; 20 MG/100ML; MG/100ML; MG/100ML; MG/100ML
INJECTION, SOLUTION INTRAVENOUS CONTINUOUS
Status: DISCONTINUED | OUTPATIENT
Start: 2023-09-09 | End: 2023-09-09 | Stop reason: HOSPADM

## 2023-09-09 RX ORDER — GLYCOPYRROLATE 0.2 MG/ML
INJECTION, SOLUTION INTRAMUSCULAR; INTRAVENOUS PRN
Status: DISCONTINUED | OUTPATIENT
Start: 2023-09-09 | End: 2023-09-09

## 2023-09-09 RX ORDER — ONDANSETRON 4 MG/1
4 TABLET, ORALLY DISINTEGRATING ORAL EVERY 30 MIN PRN
Status: DISCONTINUED | OUTPATIENT
Start: 2023-09-09 | End: 2023-09-09 | Stop reason: HOSPADM

## 2023-09-09 RX ORDER — NALOXONE HYDROCHLORIDE 0.4 MG/ML
0.2 INJECTION, SOLUTION INTRAMUSCULAR; INTRAVENOUS; SUBCUTANEOUS
Status: DISCONTINUED | OUTPATIENT
Start: 2023-09-09 | End: 2023-09-10 | Stop reason: HOSPADM

## 2023-09-09 RX ORDER — OXYCODONE HYDROCHLORIDE 5 MG/1
5 TABLET ORAL
Status: DISCONTINUED | OUTPATIENT
Start: 2023-09-09 | End: 2023-09-09 | Stop reason: HOSPADM

## 2023-09-09 RX ORDER — PROPOFOL 10 MG/ML
INJECTION, EMULSION INTRAVENOUS CONTINUOUS PRN
Status: DISCONTINUED | OUTPATIENT
Start: 2023-09-09 | End: 2023-09-09

## 2023-09-09 RX ORDER — AMOXICILLIN 250 MG
1 CAPSULE ORAL 2 TIMES DAILY
Status: DISCONTINUED | OUTPATIENT
Start: 2023-09-09 | End: 2023-09-10 | Stop reason: HOSPADM

## 2023-09-09 RX ORDER — CEFAZOLIN SODIUM/WATER 2 G/20 ML
2 SYRINGE (ML) INTRAVENOUS
Status: COMPLETED | OUTPATIENT
Start: 2023-09-09 | End: 2023-09-09

## 2023-09-09 RX ORDER — PROPOFOL 10 MG/ML
INJECTION, EMULSION INTRAVENOUS PRN
Status: DISCONTINUED | OUTPATIENT
Start: 2023-09-09 | End: 2023-09-09

## 2023-09-09 RX ORDER — ONDANSETRON 2 MG/ML
4 INJECTION INTRAMUSCULAR; INTRAVENOUS EVERY 6 HOURS PRN
Status: DISCONTINUED | OUTPATIENT
Start: 2023-09-09 | End: 2023-09-10 | Stop reason: HOSPADM

## 2023-09-09 RX ORDER — SODIUM CHLORIDE, SODIUM LACTATE, POTASSIUM CHLORIDE, CALCIUM CHLORIDE 600; 310; 30; 20 MG/100ML; MG/100ML; MG/100ML; MG/100ML
INJECTION, SOLUTION INTRAVENOUS CONTINUOUS PRN
Status: DISCONTINUED | OUTPATIENT
Start: 2023-09-09 | End: 2023-09-09

## 2023-09-09 RX ORDER — LIDOCAINE HYDROCHLORIDE 20 MG/ML
INJECTION, SOLUTION INFILTRATION; PERINEURAL PRN
Status: DISCONTINUED | OUTPATIENT
Start: 2023-09-09 | End: 2023-09-09

## 2023-09-09 RX ORDER — HYDROMORPHONE HCL IN WATER/PF 6 MG/30 ML
0.4 PATIENT CONTROLLED ANALGESIA SYRINGE INTRAVENOUS
Status: DISCONTINUED | OUTPATIENT
Start: 2023-09-09 | End: 2023-09-10 | Stop reason: HOSPADM

## 2023-09-09 RX ORDER — ONDANSETRON 2 MG/ML
INJECTION INTRAMUSCULAR; INTRAVENOUS PRN
Status: DISCONTINUED | OUTPATIENT
Start: 2023-09-09 | End: 2023-09-09

## 2023-09-09 RX ORDER — ONDANSETRON 4 MG/1
4 TABLET, ORALLY DISINTEGRATING ORAL EVERY 6 HOURS PRN
Status: DISCONTINUED | OUTPATIENT
Start: 2023-09-09 | End: 2023-09-10 | Stop reason: HOSPADM

## 2023-09-09 RX ORDER — FENTANYL CITRATE 50 UG/ML
25 INJECTION, SOLUTION INTRAMUSCULAR; INTRAVENOUS EVERY 5 MIN PRN
Status: DISCONTINUED | OUTPATIENT
Start: 2023-09-09 | End: 2023-09-09 | Stop reason: HOSPADM

## 2023-09-09 RX ORDER — ACETAMINOPHEN 325 MG/1
650 TABLET ORAL EVERY 4 HOURS PRN
Status: DISCONTINUED | OUTPATIENT
Start: 2023-09-12 | End: 2023-09-10 | Stop reason: HOSPADM

## 2023-09-09 RX ORDER — OXYCODONE HYDROCHLORIDE 5 MG/1
10 TABLET ORAL
Status: DISCONTINUED | OUTPATIENT
Start: 2023-09-09 | End: 2023-09-09 | Stop reason: HOSPADM

## 2023-09-09 RX ORDER — OXYCODONE HYDROCHLORIDE 5 MG/1
10 TABLET ORAL EVERY 4 HOURS PRN
Status: DISCONTINUED | OUTPATIENT
Start: 2023-09-09 | End: 2023-09-10 | Stop reason: HOSPADM

## 2023-09-09 RX ORDER — HYDROMORPHONE HCL IN WATER/PF 6 MG/30 ML
0.4 PATIENT CONTROLLED ANALGESIA SYRINGE INTRAVENOUS EVERY 5 MIN PRN
Status: DISCONTINUED | OUTPATIENT
Start: 2023-09-09 | End: 2023-09-09 | Stop reason: HOSPADM

## 2023-09-09 RX ORDER — ACETAMINOPHEN 325 MG/1
975 TABLET ORAL EVERY 8 HOURS
Status: DISCONTINUED | OUTPATIENT
Start: 2023-09-09 | End: 2023-09-10 | Stop reason: HOSPADM

## 2023-09-09 RX ORDER — DEXAMETHASONE SODIUM PHOSPHATE 4 MG/ML
INJECTION, SOLUTION INTRA-ARTICULAR; INTRALESIONAL; INTRAMUSCULAR; INTRAVENOUS; SOFT TISSUE PRN
Status: DISCONTINUED | OUTPATIENT
Start: 2023-09-09 | End: 2023-09-09

## 2023-09-09 RX ORDER — BISACODYL 10 MG
10 SUPPOSITORY, RECTAL RECTAL DAILY PRN
Status: DISCONTINUED | OUTPATIENT
Start: 2023-09-09 | End: 2023-09-10 | Stop reason: HOSPADM

## 2023-09-09 RX ORDER — METOPROLOL TARTRATE 1 MG/ML
1-2 INJECTION, SOLUTION INTRAVENOUS EVERY 5 MIN PRN
Status: DISCONTINUED | OUTPATIENT
Start: 2023-09-09 | End: 2023-09-09 | Stop reason: HOSPADM

## 2023-09-09 RX ORDER — HYDROMORPHONE HCL IN WATER/PF 6 MG/30 ML
0.2 PATIENT CONTROLLED ANALGESIA SYRINGE INTRAVENOUS
Status: DISCONTINUED | OUTPATIENT
Start: 2023-09-09 | End: 2023-09-10 | Stop reason: HOSPADM

## 2023-09-09 RX ORDER — INDOCYANINE GREEN AND WATER 25 MG
2.5 KIT INJECTION ONCE
Status: COMPLETED | OUTPATIENT
Start: 2023-09-09 | End: 2023-09-09

## 2023-09-09 RX ORDER — OXYCODONE HYDROCHLORIDE 5 MG/1
5 TABLET ORAL EVERY 4 HOURS PRN
Status: DISCONTINUED | OUTPATIENT
Start: 2023-09-09 | End: 2023-09-10 | Stop reason: HOSPADM

## 2023-09-09 RX ADMIN — FENTANYL CITRATE 100 MCG: 50 INJECTION, SOLUTION INTRAMUSCULAR; INTRAVENOUS at 11:22

## 2023-09-09 RX ADMIN — Medication 2 G: at 11:17

## 2023-09-09 RX ADMIN — SODIUM CHLORIDE, POTASSIUM CHLORIDE, SODIUM LACTATE AND CALCIUM CHLORIDE: 600; 310; 30; 20 INJECTION, SOLUTION INTRAVENOUS at 11:14

## 2023-09-09 RX ADMIN — ACETAMINOPHEN 975 MG: 325 TABLET, FILM COATED ORAL at 14:35

## 2023-09-09 RX ADMIN — SENNOSIDES AND DOCUSATE SODIUM 1 TABLET: 8.6; 5 TABLET ORAL at 21:56

## 2023-09-09 RX ADMIN — SODIUM CHLORIDE, POTASSIUM CHLORIDE, SODIUM LACTATE AND CALCIUM CHLORIDE: 600; 310; 30; 20 INJECTION, SOLUTION INTRAVENOUS at 14:00

## 2023-09-09 RX ADMIN — DEXAMETHASONE SODIUM PHOSPHATE 4 MG: 4 INJECTION, SOLUTION INTRA-ARTICULAR; INTRALESIONAL; INTRAMUSCULAR; INTRAVENOUS; SOFT TISSUE at 11:22

## 2023-09-09 RX ADMIN — ROCURONIUM BROMIDE 50 MG: 50 INJECTION, SOLUTION INTRAVENOUS at 11:22

## 2023-09-09 RX ADMIN — PANTOPRAZOLE SODIUM 40 MG: 40 INJECTION, POWDER, FOR SOLUTION INTRAVENOUS at 09:15

## 2023-09-09 RX ADMIN — PROPOFOL 50 MCG/KG/MIN: 10 INJECTION, EMULSION INTRAVENOUS at 11:25

## 2023-09-09 RX ADMIN — MIDAZOLAM 2 MG: 1 INJECTION INTRAMUSCULAR; INTRAVENOUS at 11:17

## 2023-09-09 RX ADMIN — SODIUM CHLORIDE, POTASSIUM CHLORIDE, SODIUM LACTATE AND CALCIUM CHLORIDE: 600; 310; 30; 20 INJECTION, SOLUTION INTRAVENOUS at 10:57

## 2023-09-09 RX ADMIN — GLYCOPYRROLATE 0.1 MG: 0.2 INJECTION, SOLUTION INTRAMUSCULAR; INTRAVENOUS at 11:35

## 2023-09-09 RX ADMIN — SUGAMMADEX 200 MG: 100 INJECTION, SOLUTION INTRAVENOUS at 12:04

## 2023-09-09 RX ADMIN — HYDROMORPHONE HYDROCHLORIDE 0.5 MG: 1 INJECTION, SOLUTION INTRAMUSCULAR; INTRAVENOUS; SUBCUTANEOUS at 11:38

## 2023-09-09 RX ADMIN — ACETAMINOPHEN 975 MG: 325 TABLET, FILM COATED ORAL at 21:56

## 2023-09-09 RX ADMIN — INDOCYANINE GREEN AND WATER 2.5 MG: KIT at 11:10

## 2023-09-09 RX ADMIN — ONDANSETRON 4 MG: 2 INJECTION INTRAMUSCULAR; INTRAVENOUS at 12:03

## 2023-09-09 RX ADMIN — PROPOFOL 200 MG: 10 INJECTION, EMULSION INTRAVENOUS at 11:22

## 2023-09-09 RX ADMIN — LIDOCAINE HYDROCHLORIDE 50 MG: 20 INJECTION, SOLUTION INFILTRATION; PERINEURAL at 11:22

## 2023-09-09 ASSESSMENT — ACTIVITIES OF DAILY LIVING (ADL)
ADLS_ACUITY_SCORE: 20

## 2023-09-09 ASSESSMENT — ENCOUNTER SYMPTOMS: DYSRHYTHMIAS: 1

## 2023-09-09 NOTE — CONSULTS
Chief complaint:  Abdominal pain, epigastric    HPI:  This patient is a 62 year old male who presented with pancreatitis and was found to have gallstones and choledocholithiasis.  He underwent ERCP with stone removal yesterday.  He now denies pain.  I was asked to evaluate the patient for cholecystectomy to prevent future episodes of biliary pancreatitis.    Past Medical History:   has a past medical history of Cholelithiasis, Depression, Kidney stone, Paroxysmal A-fib (H) (09/07/2023), and Unspecified viral hepatitis without mention of hepatic coma.    Past Surgical History:  Past Surgical History:   Procedure Laterality Date    COMBINED CYSTOSCOPY, RETROGRADES, URETEROSCOPY, LASER HOLMIUM LITHOTRIPSY URETER(S), INSERT STENT Left 3/25/2016    Procedure: COMBINED CYSTOSCOPY, RETROGRADES, URETEROSCOPY, LASER HOLMIUM LITHOTRIPSY URETER(S), INSERT STENT;  Surgeon: Gen Funes MD;  Location: [facility] OR    REMV PILONIDAL LESION SIMPLE          Social History:  Social History     Socioeconomic History    Marital status:      Spouse name: Not on file    Number of children: Not on file    Years of education: Not on file    Highest education level: Not on file   Occupational History    Not on file   Tobacco Use    Smoking status: Never    Smokeless tobacco: Not on file   Substance and Sexual Activity    Alcohol use: No    Drug use: No     Comment: drinks quite a bit of caffeine    Sexual activity: Yes     Partners: Female   Other Topics Concern    Not on file   Social History Narrative    Not on file     Social Determinants of Health     Financial Resource Strain: Not on file   Food Insecurity: Not on file   Transportation Needs: Not on file   Physical Activity: Not on file   Stress: Not on file   Social Connections: Not on file   Intimate Partner Violence: Not on file   Housing Stability: Not on file        Family History:  Family History   Problem Relation Age of Onset    Alzheimer Disease No family hx of      Cancer Paternal Aunt         colon    Cancer Paternal Aunt         coloc    Diabetes No family hx of     Heart Disease No family hx of     Cerebrovascular Disease Brother         age 40    Cancer Father     Cancer Paternal Grandfather        Review of Systems:  The 10 point Review of Systems is negative other than noted in the HPI and above.    Physical Exam:  General - This is a well developed, well nourished male in no apparent distress.  HEENT - Normocephalic, atraumatic.  No scleral icterus.  Neck - supple without masses  Lungs - clear to ascultation.    Heart - Regular rate & rhythm without murmur  Abdomen:   soft, non-distended and nontender.    Extremities - warm without edema  Neurologic - nonfocal    Relevant labs:  Labs this morning revealed a total bilirubin of 1.4, AST of 121, ALT of 260 and alkaline phosphatase of 140.  These are significantly improved from previous labs, with yesterday's total bilirubin being 5.0.  Lipase had initially been over 3000 and had improved to 648 yesterday.    Imaging:  Right upper quadrant ultrasound showed gallstones.  MRCP revealed findings concerning for a distal common duct stone.  ERCP with sphincterotomy revealed sludge within the common bile duct.    Assessment and Plan:  This is a patient with biliary pancreatitis who now presents for laparoscopic cholecystectomy to prevent future episodes.  I have recommended that we indeed proceed with laparoscopic cholecystectomy.  We discussed the procedure, along with its risks and complications, in detail.  The patient has agreed to proceed.  We will plan to take him immediately to the operating room.  Postoperatively, the patient will be sent back up to the observation unit at the request of the provider.      Marco Almaraz MD  [facility]

## 2023-09-09 NOTE — ANESTHESIA POSTPROCEDURE EVALUATION
Patient: Kendell Caban    Procedure: Procedure(s):  CHOLECYSTECTOMY, LAPAROSCOPIC       Anesthesia Type:  General    Note:  Disposition: Outpatient   Postop Pain Control: Uneventful            Sign Out: Well controlled pain   PONV: No   Neuro/Psych: Uneventful            Sign Out: Acceptable/Baseline neuro status   Airway/Respiratory: Uneventful            Sign Out: Acceptable/Baseline resp. status   CV/Hemodynamics: Uneventful            Sign Out: Acceptable CV status; No obvious hypovolemia; No obvious fluid overload   Other NRE:    DID A NON-ROUTINE EVENT OCCUR?            Last vitals:  Vitals Value Taken Time   /73 09/09/23 1300   Temp 98.2  F (36.8  C) 09/09/23 1230   Pulse 85 09/09/23 1301   Resp 16 09/09/23 1301   SpO2 94 % 09/09/23 1301   Vitals shown include unvalidated device data.    Electronically Signed By: Markos López MD  September 9, 2023  1:02 PM

## 2023-09-09 NOTE — PLAN OF CARE
"Shift from [id number]  **Pt was down in the OR from 1030 until 1330.     Inpatient Progress Note:  For complete assessment see flow sheet documentation.      Orientation: A/O x4, sleepy, lethargic post-op.  Neuro: WDL  Pain status: Pt denied pain beginning of shift. 2/10 abdominal pain after surgery. Scheduled tylenol given.  Activity: SBA with ambulation. Fall precautions. Bed alarm on.   Peripheral edema: WDL  Resp: On 1L O2 NC; Capno on. IS encouraged.  Cardiac: WDL  GI: 4 lap sites to abdomen: sutures, steri strips, and gauze secured with tape. C/D/I.  : WDL  Skin: See above  LDA: PIV x2, one infusing LR at 100 mL/hr, other SL. Tele on, sinus rhythm.  Infusions: See above.  Diet: Clears, tolerating. May advance diet as tolerated.  Consults: GI/General Surgery  Discharge Plan: Pt will likely discharge tomorrow. Discussed goals of care with GF and pt.    Vital signs:  Temp: 98.3  F (36.8  C) Temp src: Oral BP: 124/67 Pulse: 67   Resp: 18 SpO2: 95 % O2 Device: None (Room air) Oxygen Delivery: 2 LPM Height: 180.3 cm (5' 11\") Weight: 90.9 kg (200 lb 8 oz)  Estimated body mass index is 27.96 kg/m  as calculated from the following:    Height as of this encounter: 1.803 m (5' 11\").    Weight as of this encounter: 90.9 kg (200 lb 8 oz).    Josephine Christensen RN   "

## 2023-09-09 NOTE — PLAN OF CARE
"PRIMARY DIAGNOSIS: \"GALLSTONE/PANCREATITIS + AFIB\"  OUTPATIENT/OBSERVATION GOALS TO BE MET BEFORE DISCHARGE:  ADLs back to baseline: Yes    Activity and level of assistance: SBA.  Pain status: Pain free.    Return to near baseline physical activity: No     Discharge Planner Nurse   Safe discharge environment identified: Yes  Barriers to discharge: Yes       Entered by: Bud Esqueda RN 09/08/2023 11:46 PM    BP 99/61 (BP Location: Right arm)   Pulse 62   Temp 98.4  F (36.9  C) (Oral)   Resp 15   Ht 1.803 m (5' 11\")   Wt 90.9 kg (200 lb 8 oz)   SpO2 94%   BMI 27.96 kg/m       A&O x 4 .VSS On RA.Pt denies pain.IVF infusing.On clear liquid diet tolerating well.  Plan for cholecystectomy.General Surgery consult.Per tele reading NSR 63.    Please review provider order for any additional goals.   Nurse to notify provider when observation goals have been met and patient is ready for discharge.Goal Outcome Evaluation:                        "

## 2023-09-09 NOTE — ANESTHESIA PREPROCEDURE EVALUATION
Anesthesia Pre-Procedure Evaluation    Patient: Kendell Caban   MRN: [id number] : 1960        Procedure : Procedure(s):  CHOLECYSTECTOMY, LAPAROSCOPIC          Past Medical History:   Diagnosis Date    Cholelithiasis     Depression     Kidney stone     Paroxysmal A-fib (H) 2023    Unspecified viral hepatitis without mention of hepatic coma     as infant      Past Surgical History:   Procedure Laterality Date    COMBINED CYSTOSCOPY, RETROGRADES, URETEROSCOPY, LASER HOLMIUM LITHOTRIPSY URETER(S), INSERT STENT Left 3/25/2016    Procedure: COMBINED CYSTOSCOPY, RETROGRADES, URETEROSCOPY, LASER HOLMIUM LITHOTRIPSY URETER(S), INSERT STENT;  Surgeon: Gen Funes MD;  Location: [facility] OR    REMV PILONIDAL LESION SIMPLE        Allergies   Allergen Reactions    No Known Drug Allergy       Social History     Tobacco Use    Smoking status: Never    Smokeless tobacco: Not on file   Substance Use Topics    Alcohol use: No      Wt Readings from Last 1 Encounters:   23 90.9 kg (200 lb 8 oz)        Anesthesia Evaluation            ROS/MED HX  ENT/Pulmonary:       Neurologic:       Cardiovascular: Comment: Study Date: 2023 07:55 AM  Age: 62 yrs  Gender: Male  [facility]  Reason For Study: Syncope  Ordering Physician: LEXUS WOODS  Performed By: Herminia Francisco     BSA: 2.1 m2  Height: 71 in  Weight: 201 lb  HR: 65  BP: 139/98 mmHg  ______________________________________________________________________________  Procedure  Complete Portable Echo Adult. Optison (NDC #[id number]) given intravenously.  ______________________________________________________________________________  Interpretation Summary     The visual ejection fraction is 60-65%.  The right ventricle is normal in size and function.  There is no pericardial effusion.  The inferior vena cava was normal in size with preserved respiratory  variability.  Mild aortic root dilatation. Measure 4 cms.     Irregular rhythm  noted. Appears AF. Correlate with ECG monitoring.      (+)  - -   -  - -      CHF                  dysrhythmias (PAF), a-fib,          (-) hypertension   METS/Exercise Tolerance: >4 METS    Hematologic:       Musculoskeletal:       GI/Hepatic:     (+)          cholecystitis/cholelithiasis,   liver disease,    (-) GERD   Renal/Genitourinary:     (+)       Nephrolithiasis ,       Endo:       Psychiatric/Substance Use:     (+) psychiatric history depression       Infectious Disease:       Malignancy:       Other:            Physical Exam    Airway        Mallampati: II   TM distance: > 3 FB   Neck ROM: full   Mouth opening: > 3 cm    Respiratory Devices and Support         Dental           Cardiovascular   cardiovascular exam normal          Pulmonary   pulmonary exam normal            Other findings: ECG - A fib    Lab Test        09/09/23 09/08/23 09/07/23                       0526          0549          1446          WBC          9.8          10.4         13.1*         HGB          13.3         14.6         16.1          MCV          94           91           91            PLT          223          227          249           INR           --          1.07          --            Lab Test        09/09/23 09/08/23 09/08/23 09/07/23                       0526          1403          0549          1446          NA           139           --          140          139           POTASSIUM    3.9           --          3.9          4.4           CHLORIDE     104           --          104          102           CO2          26            --          28           28            BUN          14.4          --          12.9         14.0          CR           1.01          --          1.19*        1.01          ANIONGAP     9             --          8            9             NUBIA          8.9           --          9.4          10.7*         GLC          137*         91           110*         136*               OUTSIDE LABS:  CBC:   Lab Results   Component Value Date    WBC 9.8 09/09/2023    WBC 10.4 09/08/2023    HGB 13.3 09/09/2023    HGB 14.6 09/08/2023    HCT 39.2 (L) 09/09/2023    HCT 41.2 09/08/2023     09/09/2023     09/08/2023     BMP:   Lab Results   Component Value Date     09/09/2023     09/08/2023    POTASSIUM 3.9 09/09/2023    POTASSIUM 3.9 09/08/2023    CHLORIDE 104 09/09/2023    CHLORIDE 104 09/08/2023    CO2 26 09/09/2023    CO2 28 09/08/2023    BUN 14.4 09/09/2023    BUN 12.9 09/08/2023    CR 1.01 09/09/2023    CR 1.19 (H) 09/08/2023     (H) 09/09/2023    GLC 91 09/08/2023     COAGS:   Lab Results   Component Value Date    PTT 24 09/07/2023    INR 1.07 09/08/2023     POC: No results found for: BGM, HCG, HCGS  HEPATIC:   Lab Results   Component Value Date    ALBUMIN 3.4 (L) 09/09/2023    PROTTOTAL 6.1 (L) 09/09/2023     (H) 09/09/2023     (H) 09/09/2023    ALKPHOS 140 (H) 09/09/2023    BILITOTAL 1.4 (H) 09/09/2023     OTHER:   Lab Results   Component Value Date    NUBIA 8.9 09/09/2023    LIPASE 648 (H) 09/08/2023    TSH 1.89 09/08/2023       Anesthesia Plan    ASA Status:  2       Anesthesia Type: General.     - Airway: ETT   Induction: Propofol.   Maintenance: Balanced.        Consents    Anesthesia Plan(s) and associated risks, benefits, and realistic alternatives discussed. Questions answered and patient/representative(s) expressed understanding.     - Discussed: Risks, Benefits and Alternatives for the PROCEDURE were discussed     - Discussed with:  Patient      - Extended Intubation/Ventilatory Support Discussed: No.      - Patient is DNR/DNI Status: No     Use of blood products discussed: No .     Postoperative Care    Pain management: IV analgesics, Oral pain medications.   PONV prophylaxis: Ondansetron (or other 5HT-3), Background Propofol Infusion     Comments:    Other Comments: Metoprolol held this AM              Markos López MD

## 2023-09-09 NOTE — PLAN OF CARE
"[id number]    Inpatient Progress Note:Gallstone pancreatitis.     BP 99/61 (BP Location: Right arm)   Pulse 62   Temp 98.4  F (36.9  C) (Oral)   Resp 15   Ht 1.803 m (5' 11\")   Wt 90.9 kg (200 lb 8 oz)   SpO2 94%   BMI 27.96 kg/m         Orientation: A&OX4  Neuro: WDL  Pain status: pt denies  Activity: SBA  Peripheral edema: trace bilateral   Resp: WDL  Cardiac: on tele SB 50s  GI: abdomen soft, active.  :  Continent of B&B  Skin: WDL  LDA: PVI  Infusions: SL  Pertinent Labs:   Diet: clear liquid   Consults: General surgery  Discharge Plan: Possible cholecystectomy    Will continue to provide cares.     Larry Crawford RN     Goal Outcome Evaluation:                        "

## 2023-09-09 NOTE — ANESTHESIA PROCEDURE NOTES
Airway       Patient location during procedure: OR       Procedure Start/Stop Times: 9/9/2023 11:24 AM  Staff -        CRNA: Alley Whitmore APRN CRNA       Performed By: anesthesiologist and CRNA  Consent for Airway        Urgency: elective  Indications and Patient Condition       Indications for airway management: sarah beth-procedural       Induction type:intravenous       Mask difficulty assessment: 1 - vent by mask    Final Airway Details       Final airway type: endotracheal airway       Successful airway: ETT - single and Oral  Endotracheal Airway Details        ETT size (mm): 8.0       Cuffed: yes       Successful intubation technique: direct laryngoscopy       DL Blade Type: Solis 2       Grade View of Cords: 2 (slightly anterior)       Adjucts: stylet       Position: Right       Measured from: gums/teeth       Secured at (cm): 22       Bite block used: None    Post intubation assessment        Placement verified by: capnometry, equal breath sounds and chest rise        Number of attempts at approach: 1       Number of other approaches attempted: 0       Secured with: plastic tape       Ease of procedure: easy       Dentition: Unchanged    Medication(s) Administered   Medication Administration Time: 9/9/2023 11:24 AM

## 2023-09-09 NOTE — PROGRESS NOTES
[facility]    Medicine Progress Note - Hospitalist Service    Date of Admission:  9/7/2023    Assessment & Plan   Kendell Caban is a 62 year old male with history of nephrolithiasis, depression admitted on 9/7/2023 with abdominal pain. Found to have gallstone pancreatitis      Choledocholithiasis  Gallstone pancreatitis    - US showed cholelithiasis, no cholecystitis or duct dilation    - MRCP on 9/8 showed questionable filling defect/debris's in the distal common bile duct, however there is no significant upstream biliary duct dilatation    - s/p ERCP on 9/8 with filling defect consistent with stone versus air bubbles, biliary sphincterotomy, and biliary tree was swept and sludge was found    - LFTs stable and downward trending on recheck     - no role for trending lipase     - GI consulted and completed ERCP    - discussed with patient and surgery, plan for laparoscopic cholecystectomy on 9/9, NPO for procedure     - pain improved, none currently       Reflux symptoms    - has had chronic reflux symptoms    - EGD as outpt if needed, unclear if complete evaluation of esophagus or stomach was performed with ERCP on 9/8    - on IV protonix (can discharge with oral PPI)     New onset atrial fibrillation, likely paroxysmal    - found to be in a fib in ED    - fairly rate controlled    - no beta blocker stated due to soft BP    - tele, converted to NSR overnight on 9/8    - TSH normal    - no current need for zio patch at discharge unless recurrence of A-fib this stay     - caffeine consumption contributing (drinks many Mountain Dews). Patient counseled on moderation     History of depression    - remote, not currently on any medications     History renal stone/ genital herpes    - stable    Diet:  NPO, ADAT to low fat after procedure   DVT Prophylaxis: Low Risk/Ambulatory with no VTE prophylaxis indicated  Galloway Catheter: Not present  Lines: None     Cardiac Monitoring: ACTIVE order.  "Indication: Tachyarrhythmias, acute (48 hours)  Code Status: Full Code      Clinically Significant Risk Factors           # Hypercalcemia: Highest Ca = 10.7 mg/dL in last 2 days, will monitor as appropriate    # Hypoalbuminemia: Lowest albumin = 3.4 g/dL at 9/9/2023  5:26 AM, will monitor as appropriate            # Overweight: Estimated body mass index is 27.96 kg/m  as calculated from the following:    Height as of this encounter: 1.803 m (5' 11\").    Weight as of this encounter: 90.9 kg (200 lb 8 oz)., PRESENT ON ADMISSION            Disposition Plan     Expected Discharge Date: 09/09/2023                The patient's care was discussed with the Attending Physician, Dr. Spears, Bedside Nurse, Patient, Patient's Family, and General surgery Consultant(s).    Kayla Skaggs PA-C  Hospitalist Service  [facility]  Securely message with [employer] (more info)  Text page via [facility] Paging/Directory   ______________________________________________________________________    Interval History   Assumed care of patient and chart reviewed. Patient reports no current abdominal pain, nausea or vomiting. Patient noticed his heart rate has gone back into the 60s overnight. Denies chest pain, shortness of breath, or palpitations. Patient expresses preference to have gallbladder removed here instead of waiting until outpatient.     Discussed possible cholecystectomy with Dr. Almaraz this morning before seeing patient.    Patient's girlfriend at bedside during interview and updated on the current plan.     Physical Exam   Vital Signs: Temp: 98.2  F (36.8  C) Temp src: Temporal BP: 127/77 Pulse: 75   Resp: 16 SpO2: 97 % O2 Device: Simple face mask Oxygen Delivery: 6 LPM  Weight: 200 lbs 8 oz    General Appearance: A&Ox4, laying in bed, NAD  Respiratory: CTAB without wheezing or rales  Cardiovascular: RRR without murmur  GI: soft, nontender, nondistended, normoactive bowel sounds   Skin: warm, dry, no lesions " in visualized area, no jaundice     Medical Decision Making       45 MINUTES SPENT BY ME on the date of service doing chart review, history, exam, documentation & further activities per the note.      Data   ------------------------- PAST 24 HR DATA REVIEWED -----------------------------------------------

## 2023-09-09 NOTE — PROGRESS NOTES
"GASTROENTEROLOGY PROGRESS NOTE       SUBJECTIVE:  Feels better. Minor incisional pain after lap deandra.     OBJECTIVE:  /67 (BP Location: Right arm)   Pulse 67   Temp 98.3  F (36.8  C) (Oral)   Resp 18   Ht 1.803 m (5' 11\")   Wt 90.9 kg (200 lb 8 oz)   SpO2 95%   BMI 27.96 kg/m    Temp (24hrs), Av.3  F (36.8  C), Min:97.7  F (36.5  C), Max:98.7  F (37.1  C)    Patient Vitals for the past 72 hrs:   Weight   23 0108 90.9 kg (200 lb 8 oz)   23 1441 91.2 kg (201 lb 1 oz)       Intake/Output Summary (Last 24 hours) at 2023 1656  Last data filed at 2023 1326  Gross per 24 hour   Intake 900 ml   Output --   Net 900 ml        PHYSICAL EXAM     Eating, no distress      Recent Labs   Lab Test 23  0526 23  0549 23  1446   WBC 9.8 10.4 13.1*   HGB 13.3 14.6 16.1   MCV 94 91 91    227 249   INR  --  1.07  --      Recent Labs   Lab Test 23  0526 23  0549 23  1446   POTASSIUM 3.9 3.9 4.4   CHLORIDE 104 104 102   CO2 26 28 28   BUN 14.4 12.9 14.0   ANIONGAP 9 8 9     Recent Labs   Lab Test 23  0526 23  0549 23  1446 16  1138 16  0957   ALBUMIN 3.4* 3.6 4.3  --   --    BILITOTAL 1.4* 5.0* 2.3*  --   --    * 383* 266*  --   --    * 316* 379*  --   --    PROTEIN  --   --   --  10* 10*   LIPASE  --  648* >3,000*  --   --            Principal Problem:    Gall stone pancreatitis    Assessment: Had ERCP yesterday, lap deandra today. LFTs improving.    Plan: Will sign off. Likely home tomorrow.           Fabián Taylor MD  [facility]  Office:  [phone]  "

## 2023-09-09 NOTE — ANESTHESIA CARE TRANSFER NOTE
Patient: Kendell Caban    Procedure: Procedure(s):  CHOLECYSTECTOMY, LAPAROSCOPIC       Diagnosis: Gall stone pancreatitis [K85.10]  Diagnosis Additional Information: No value filed.    Anesthesia Type:   General     Note:    Oropharynx: oropharynx clear of all foreign objects  Level of Consciousness: drowsy  Oxygen Supplementation: face mask  Level of Supplemental Oxygen (L/min / FiO2): 6  Independent Airway: airway patency satisfactory and stable  Dentition: dentition unchanged  Vital Signs Stable: post-procedure vital signs reviewed and stable  Report to RN Given: handoff report given  Patient transferred to: PACU    Handoff Report: Identifed the Patient, Identified the Reponsible Provider, Reviewed the pertinent medical history, Discussed the surgical course, Reviewed Intra-OP anesthesia mangement and issues during anesthesia, Set expectations for post-procedure period and Allowed opportunity for questions and acknowledgement of understanding      Vitals:  Vitals Value Taken Time   BP     Temp     Pulse     Resp     SpO2 98 % 09/09/23 1222   Vitals shown include unvalidated device data.    Electronically Signed By: CARLOTA Haines CRNA  September 9, 2023  12:23 PM

## 2023-09-10 VITALS
BODY MASS INDEX: 28.07 KG/M2 | RESPIRATION RATE: 15 BRPM | HEART RATE: 66 BPM | OXYGEN SATURATION: 94 % | HEIGHT: 71 IN | DIASTOLIC BLOOD PRESSURE: 69 MMHG | SYSTOLIC BLOOD PRESSURE: 117 MMHG | TEMPERATURE: 98.5 F | WEIGHT: 200.5 LBS

## 2023-09-10 PROCEDURE — C9113 INJ PANTOPRAZOLE SODIUM, VIA: HCPCS | Mod: JZ | Performed by: SURGERY

## 2023-09-10 PROCEDURE — 250N000013 HC RX MED GY IP 250 OP 250 PS 637: Performed by: SURGERY

## 2023-09-10 PROCEDURE — 250N000011 HC RX IP 250 OP 636: Mod: JZ | Performed by: SURGERY

## 2023-09-10 PROCEDURE — 93010 ELECTROCARDIOGRAM REPORT: CPT | Performed by: INTERNAL MEDICINE

## 2023-09-10 PROCEDURE — 99239 HOSP IP/OBS DSCHRG MGMT >30: CPT | Performed by: PHYSICIAN ASSISTANT

## 2023-09-10 RX ORDER — ACETAMINOPHEN 325 MG/1
650 TABLET ORAL EVERY 4 HOURS PRN
COMMUNITY
Start: 2023-09-12 | End: 2023-09-10

## 2023-09-10 RX ORDER — ACETAMINOPHEN 325 MG/1
650 TABLET ORAL EVERY 6 HOURS PRN
COMMUNITY
Start: 2023-09-10

## 2023-09-10 RX ORDER — PANTOPRAZOLE SODIUM 40 MG/1
40 TABLET, DELAYED RELEASE ORAL DAILY
Qty: 30 TABLET | Refills: 0 | Status: SHIPPED | OUTPATIENT
Start: 2023-09-10

## 2023-09-10 RX ADMIN — PANTOPRAZOLE SODIUM 40 MG: 40 INJECTION, POWDER, FOR SOLUTION INTRAVENOUS at 08:05

## 2023-09-10 RX ADMIN — ACETAMINOPHEN 975 MG: 325 TABLET, FILM COATED ORAL at 08:05

## 2023-09-10 ASSESSMENT — ACTIVITIES OF DAILY LIVING (ADL)
ADLS_ACUITY_SCORE: 20

## 2023-09-10 NOTE — PLAN OF CARE
"[id number]    Inpatient Progress Note:Gallstone pancreatitis.     /66 (BP Location: Right arm)   Pulse 59   Temp 98.6  F (37  C) (Oral)   Resp 22   Ht 1.803 m (5' 11\")   Wt 90.9 kg (200 lb 8 oz)   SpO2 96%   BMI 27.96 kg/m         Orientation: A&OX4  Neuro: WDL  Pain status: pt denies  Activity: SBA  Peripheral edema: trace bilateral   Resp: WDL  Cardiac: SR  GI: 4 lap sites to abdomen: sutures, steri strips, and gauze secured with tape. C/D/I.   :  Continent of B&B. No BM yet . Patient  refused senna  Skin: WDL  LDA: PVI  Infusions: LR 100ml/hr  Pertinent Labs:   Diet: full liquid   Consults: General surgery  Discharge Plan: Possible discharge to home    Will continue to provide cares.     Larry Crawford RN     Goal Outcome Evaluation:                        "

## 2023-09-10 NOTE — DISCHARGE SUMMARY
[facility] Discharge Summary          Kendell Caban MRN# [id number]   Age: 62 year old YOB: 1960     Date of Admission:  9/7/2023  Date of Discharge::  9/10/2023 12:40 PM  Admitting Physician:  Tremayne Martinez MD  Discharge Physician:  Darcie Penn PA-C  Primary Physician: No Ref-Primary, Physician     Primary Discharge Diagnoses:   Choledocholithiasis  Gallstone pancreatitis  S/p Laparoscopic Cholecystectomy  Paroxysmal Atrial Fibrillation     Hospital Course:   For detail history, please refer to H & P from 9/7/2023. In brief, this is a 62 year old male with history of nephrolithiasis, depression admitted on 9/7/2023 with abdominal pain. Found to have gallstone pancreatitis       Choledocholithiasis  Gallstone pancreatitis    - US showed cholelithiasis, no cholecystitis or duct dilation    - MRCP on 9/8 showed questionable filling defect/debris's in the distal common bile duct, however there is no significant upstream biliary duct dilatation    - s/p ERCP on 9/8 with filling defect consistent with stone versus air bubbles, biliary sphincterotomy, and biliary tree was swept and sludge was found    - LFTs downward trending     - General Surgery consulted and patient underwent a laparoscopic cholecystectomy on 9/9/23      GERD    - has had chronic reflux symptoms    - discharged on Protonix    - follow up with GI     New onset atrial fibrillation, likely paroxysmal    - found to be in a fib in ED on arrival    - no beta blocker stated due to soft BP    - tele, converted to NSR overnight on 9/8 and remained in NSR    - TSH normal    - caffeine consumption contributing (drinks many Mountain Dews). Patient counseled on moderation     - discharged with a Zio patch.  Follow up with PCP for results    Procedures/Imaging:     Results for orders placed or performed during the hospital encounter of 09/07/23   Abdomen US, limited (RUQ only)    Narrative    US ABDOMEN LIMITED 9/7/2023  4:23 PM    CLINICAL HISTORY: eval for elevated lfts,   TECHNIQUE: Limited abdominal ultrasound.    COMPARISON: CT 3/13/2016    FINDINGS:    GALLBLADDER: Multiple stones within a nondistended gallbladder. No  definite wall thickening or pericholecystic fluid. Sonographic Randhawa  sign is negative.    BILE DUCTS: There is no intrahepatic biliary dilatation. The common  duct measures 5 mm.    LIVER: Diffusely increased echogenicity with decreased conspicuity of  portal triads.    RIGHT KIDNEY: No hydronephrosis.    PANCREAS: The pancreas is largely obscured by overlying gas.    No ascites.      Impression    IMPRESSION:  1.  Cholelithiasis without definite sonographic evidence of acute  cholecystitis or biliary obstruction.  2.  Hepatic steatosis.    SHELLEY MOHAN MD         SYSTEM ID:  [id number]   MR Abdomen MRCP w/o & w Contrast    Narrative    MR ABDOMEN MRCP WITHOUT AND WITH CONTRAST  9/8/2023 10:14 AM    INDICATION: Gallstone pancreatitis. Normal duct, bilirubin rising.    COMPARISON: Abdominal ultrasound 9/7/2023.    TECHNIQUE: Multiplanar multisequence images of the abdomen acquired  before and after administration of 9 mL Gadavist intravenous contrast.  MRCP imaging was also performed. 3-D MRCP was performed using maximum  intensity projection reconstruction on the same workstation.    FINDINGS:   Study limitation: Motion degradation.    LIVER: Normal morphology. No focal hepatic observation. Minimal  hepatic steatosis.     BILIARY: Limited MRCP images due to motion degradation. Gallbladder is  not distended and full of large gallstones. There is mild wall  thickening without significant appearing cholecystic fluid or  inflammation.    Possible filling defect/debris in the distal common bile duct (7/19;  10/23), however, there is no significant upstream biliary duct  dilatation (CBD measures 6-7 mm). Mild distal CBD peribiliary  enhancement within the pancreatic head (22/62).    SPLEEN:  Unremarkable.    PANCREAS: No duct dilatation. No evidence of a mass. No evidence of  necrotizing pancreatitis. Possible subtle edema and loss of T1  hyperintensity of the pancreatic head without peripancreatic fluid  collection.    ADRENAL GLANDS: Unremarkable.    KIDNEYS: Punctate left renal cyst. No solid renal mass or collecting  system dilatation.    BOWEL: No bowel dilatation.    LYMPH NODE: No enlarged lymph node.    VASCULATURE: Nonaneurysmal abdominal aorta. Portal veins are patent.  SMV and splenic vein are patent.    MUSCULOSKELETAL: No aggressive osseous lesion.    LUNG BASES: Elevation of the left hemidiaphragm with adjacent  atelectasis. Right basilar atelectasis.      Impression    IMPRESSION:     1. Questionable filling defect/debris in the distal common bile duct,  however, there is no significant upstream biliary duct dilatation (CBD  measures 6-7 mm).    2. Mild distal CBD peribiliary enhancement could relate to recently  passed stone or be reactive.    3. Nondistended gallbladder filled with gallstones and mild wall  thickening, however, without pericholecystic fluid or inflammation to  suggest acute cholecystitis. Findings could reflect chronic  cholecystitis. Clinical correlation is recommended.    4. No evidence of necrotizing pancreatitis or significant  peripancreatic inflammation or fluid collection.    BENJI SIU MD         SYSTEM ID:  [id number]   XR Surgery CARLOS L/T 5 Min Fluoro    Narrative    This exam was marked as non-reportable because it will not be read by a   radiologist or a [place] non-radiologist provider.         Echocardiogram Complete     Value    LVEF  60-65%    Narrative    [id number]  [id number]  [id number]  [id number]^CHUCK^LEXUS^T     [facility]  Echocardiography Laboratory  [address] [place] [address]     Name: SHELLY WADDELL  MRN: [id number]  : 1960  Study Date: 2023 07:55 AM  Age: 62 yrs  Gender: Male  Patient  Location: [facility]  Reason For Study: Syncope  Ordering Physician: LEXUS WOODS  Performed By: Herminia Francisco     BSA: 2.1 m2  Height: 71 in  Weight: 201 lb  HR: 65  BP: 139/98 mmHg  ______________________________________________________________________________  Procedure  Complete Portable Echo Adult. Optison (NDC #[id number]) given intravenously.  ______________________________________________________________________________  Interpretation Summary     The visual ejection fraction is 60-65%.  The right ventricle is normal in size and function.  There is no pericardial effusion.  The inferior vena cava was normal in size with preserved respiratory  variability.  Mild aortic root dilatation. Measure 4 cms.     Irregular rhythm noted. Appears AF. Correlate with ECG monitoring.  ______________________________________________________________________________  Left Ventricle  The left ventricle is normal in size. There is normal left ventricular wall  thickness. There is concentric remodeling present. Left ventricular systolic  function is normal. The visual ejection fraction is 60-65%. No regional wall  motion abnormalities noted.     Right Ventricle  The right ventricle is normal in size and function.     Atria  The left atrium is mildly dilated. Right atrial size is normal. There is no  color Doppler evidence of an atrial shunt.     Mitral Valve  The mitral valve is normal in structure and function. There is mild (1+)  mitral regurgitation. There is no mitral valve stenosis.     Tricuspid Valve  The tricuspid valve is not well visualized, but is grossly normal. The right  ventricular systolic pressure is approximated at 15.0 mmHg plus the right  atrial pressure. There is trace tricuspid regurgitation.     Aortic Valve  The aortic valve is trileaflet with aortic valve sclerosis. No aortic stenosis  is present.     Pulmonic Valve  The pulmonic valve is not well visualized. There is trace pulmonic  valvular  regurgitation.     Vessels  Mild aortic root dilatation. Measure 4 cms. Normal size ascending aorta. The  inferior vena cava was normal in size with preserved respiratory variability.     Pericardium  There is no pericardial effusion.     ______________________________________________________________________________  MMode/2D Measurements & Calculations  IVSd: 1.1 cm  LVIDd: 3.9 cm  LVIDs: 2.5 cm  LVPWd: 0.97 cm  IVC diam: 1.4 cm  FS: 35.7 %     LV mass(C)d: 125.1 grams  LV mass(C)dI: 59.2 grams/m2  Ao root diam: 4.0 cm  LA dimension: 3.5 cm  asc Aorta Diam: 3.7 cm  LA/Ao: 0.88  LVOT diam: 2.0 cm  LVOT area: 3.2 cm2  Ao root diam Index (cm/m2): 1.9  asc Aorta Diam Index (cm/m2): 1.8  LA Volume (BP): 66.7 ml  LA Volume Index (BP): 31.6 ml/m2  RV Base: 3.9 cm  RWT: 0.49     TAPSE: 1.5 cm     Doppler Measurements & Calculations  MV max PG: 3.4 mmHg  MV mean P.4 mmHg  MV V2 VTI: 24.8 cm  MVA(VTI): 2.1 cm2  Ao V2 max: 110.7 cm/sec  Ao max P.0 mmHg  Ao V2 mean: 76.8 cm/sec  Ao mean P.7 mmHg  Ao V2 VTI: 20.6 cm  KEVIN(I,D): 2.5 cm2  KEVNI(V,D): 2.8 cm2  LV V1 max PG: 3.7 mmHg  LV V1 max: 96.3 cm/sec  LV V1 VTI: 16.0 cm  SV(LVOT): 51.2 ml  SI(LVOT): 24.2 ml/m2  PA V2 max: 88.2 cm/sec  PA max PG: 3.1 mmHg  PA mean P.0 mmHg  PA V2 VTI: 17.4 cm  PA acc time: 0.11 sec  TR max ken: 193.7 cm/sec  TR max PG: 15.0 mmHg  AV Ken Ratio (DI): 0.87  KEVIN Index (cm2/m2): 1.2  RV S Ken: 12.8 cm/sec     ______________________________________________________________________________  Report approved by: Zainab Matute 2023 10:32 AM           Allergies:     Allergies   Allergen Reactions    No Known Drug Allergy         Subjective:   Patient reports abdominal pain has improved.  Tolerated cereal for breakfast.  Denies any nausea.  Passing flatus.  Feels ready to discharge home.    Physical Exam:   Blood pressure 115/70, pulse 65, temperature 98.3  F (36.8  C), temperature source Oral, resp. rate 16, height 1.803  "m (5' 11\"), weight 90.9 kg (200 lb 8 oz), SpO2 93 %.  General: Alert, interactive, NAD  HEENT: AT/NC  Resp: clear to auscultation bilaterally, no crackles or wheezes  Cardiac: regular rate and rhythm, no murmur  Abdomen: Soft, mild incisional tenderness as expected.  No erythema or drainage.  Bandages c/d/i, mild distension. Decreased BS.  No rebound or guarding.  Extremities: No LE edema  Skin: Warm and dry  Neuro: Alert & oriented x 3, no focal deficits, moves all extremities equally   Discharge Medicatios:        Discharge Medication List as of 9/10/2023 12:10 PM        START taking these medications    Details   pantoprazole (PROTONIX) 40 MG EC tablet Take 1 tablet (40 mg) by mouth daily, Disp-30 tablet, R-0, E-Prescribe           CONTINUE these medications which have CHANGED    Details   acetaminophen (TYLENOL) 325 MG tablet Take 2 tablets (650 mg) by mouth every 6 hours as needed for other (For optimal non-opioid multimodal pain management to improve pain control.), OTC             Instructions Given to Patient as Discharge:     Discharge Procedure Orders   Reason for your hospital stay   Order Comments: You were hospitalized due to gallstone pancreatitis.  You underwent and ERCP and removal of your Gallbladder.  You were briefly noted to be in atrial fibrillation which converted back to a normal rhythm.     Follow-up and recommended labs and tests    Order Comments: Return for placement of a Zio patch heart monitor.  This will monitor your heart for any recurrence of Afib.  Please follow up with PCP in one week.  Follow up with General Surgery as recommended.     Activity   Order Comments: Your activity upon discharge: activity as tolerated.  No lifting >20 pounds for 3 weeks.     Order Specific Question Answer Comments   Is discharge order? Yes      Adult Leadless EKG Monitor 3 to 7 Days   Standing Status: Future Standing Exp. Date: 09/10/24     Order Specific Question Answer Comments   Order completed for? " Adult Cardiology    Schedule hook-up appt at [facility] [8]    Patient should wear the monitor for 7 days      Diet   Order Comments: Follow this diet upon discharge: Orders Placed This Encounter      Low Fat Diet (up to 50g)     Order Specific Question Answer Comments   Is discharge order? Yes        Pending Tests at Discharge:   none    Discharge Disposition:     Discharged to home     Darcie Penn MS, PA-C  Hospitalist Service  Pager [phone]    >30 minutes was spent in discharge planning, care coordination, physical examination and medication reconciliation on the date of discharge, 9/10/2023

## 2023-09-10 NOTE — PLAN OF CARE
"Shift from 1500 2300     Inpatient Progress Note:  For complete assessment see flow sheet documentation.      Orientation: A/O x 4.  Neuro: WDL  Pain status: Pt denied pain . Scheduled tylenol given.  Activity: SBA with ambulation. Fall precautions. Bed alarm on.   Peripheral edema: WDL  Resp: On 1L O2 NC; Capno on. IS encouraged.  Cardiac: WDL  GI: 4 lap sites to abdomen: sutures, steri strips, and gauze secured with tape. C/D/I.  : WDL  LDA: PIV x2, one infusing LR at 100 mL/hr,  Diet: Full liquid, tolerating. May advance diet as tolerated.  Consults: GI/General Surgery  Discharge Plan: Pt will likely discharge tomorrow. Discussed goals of care with GF and pt.    Vital signs:  Temp: 98.2  F (36.8  C) Temp src: Oral BP: 124/70 Pulse: 68   Resp: 18 SpO2: 95 % O2 Device: None (Room air) Oxygen Delivery: 2 LPM Height: 180.3 cm (5' 11\") Weight: 90.9 kg (200 lb 8 oz)  Estimated body mass index is 27.96 kg/m  as calculated from the following:    Height as of this encounter: 1.803 m (5' 11\").    Weight as of this encounter: 90.9 kg (200 lb 8 oz).     Discontinued Cardiac monitoring.  "

## 2023-09-10 NOTE — PLAN OF CARE
Patient's After Visit Summary was reviewed with patient and significant other.   Patient verbalized understanding of After Visit Summary, recommended follow up and was given an opportunity to ask questions.   Discharge medications sent home with patient/family: protonix  Discharged with: s/o Meron      Inpt note: A&Ox4, ind, tolerated low fat diet, incisions CDI with steristrips, tylenol for pain, voiding without difficulties.

## 2023-09-12 ENCOUNTER — HOSPITAL ENCOUNTER (OUTPATIENT)
Dept: CARDIOLOGY | Facility: CLINIC | Age: 63
Discharge: HOME | End: 2023-09-12
Attending: PHYSICIAN ASSISTANT | Admitting: PHYSICIAN ASSISTANT
Payer: COMMERCIAL

## 2023-09-12 ENCOUNTER — PATIENT OUTREACH (OUTPATIENT)
Dept: CARE COORDINATION | Facility: CLINIC | Age: 63
End: 2023-09-12
Payer: COMMERCIAL

## 2023-09-12 DIAGNOSIS — I48.91 NEW ONSET ATRIAL FIBRILLATION (H): ICD-10-CM

## 2023-09-12 LAB
ATRIAL RATE - MUSE: 68 BPM
DIASTOLIC BLOOD PRESSURE - MUSE: NORMAL MMHG
INTERPRETATION ECG - MUSE: NORMAL
P AXIS - MUSE: 35 DEGREES
PR INTERVAL - MUSE: 184 MS
QRS DURATION - MUSE: 82 MS
QT - MUSE: 400 MS
QTC - MUSE: 425 MS
R AXIS - MUSE: 5 DEGREES
SYSTOLIC BLOOD PRESSURE - MUSE: NORMAL MMHG
T AXIS - MUSE: 34 DEGREES
VENTRICULAR RATE- MUSE: 68 BPM

## 2023-09-12 PROCEDURE — 93242 EXT ECG>48HR<7D RECORDING: CPT

## 2023-09-12 PROCEDURE — 93244 EXT ECG>48HR<7D REV&INTERPJ: CPT | Performed by: INTERNAL MEDICINE

## 2023-09-12 NOTE — PROGRESS NOTES
[facility] Care Resource Center Contact  [facility]/Voicemail     Clinical Data: Post-Discharge Outreach     Outreach attempted x 2.  Left message on patient's voicemail, providing [facility]'s 24/7 scheduling and nurse triage phone number [phone] ([phone]) for questions/concerns and/or to schedule an appt with an [facility] provider, if they do not have a PCP.      Plan:  [facility] will do no further outreaches at this time.       Laureen Shearer MA  [facility] Care Resource [place], [facility]    *Connected Care Resource Team does NOT follow patient ongoing. Referrals are identified based on internal discharge reports and the outreach is to ensure patient has an understanding of their discharge instructions.

## 2023-09-14 LAB
PATH REPORT.COMMENTS IMP SPEC: NORMAL
PATH REPORT.COMMENTS IMP SPEC: NORMAL
PATH REPORT.FINAL DX SPEC: NORMAL
PATH REPORT.GROSS SPEC: NORMAL
PATH REPORT.MICROSCOPIC SPEC OTHER STN: NORMAL
PATH REPORT.RELEVANT HX SPEC: NORMAL
PHOTO IMAGE: NORMAL

## 2023-09-29 ENCOUNTER — TELEPHONE (OUTPATIENT)
Dept: SURGERY | Facility: CLINIC | Age: 63
End: 2023-09-29
Payer: COMMERCIAL

## 2023-09-29 DIAGNOSIS — Z98.890 POSTOPERATIVE STATE: Primary | ICD-10-CM

## 2023-09-29 PROCEDURE — 99024 POSTOP FOLLOW-UP VISIT: CPT | Performed by: PHYSICIAN ASSISTANT

## 2023-09-29 NOTE — TELEPHONE ENCOUNTER
[facility]   Postoperative Follow-up Phone Call  -Call to patient to review recent procedure and recovery    Procedure Date:  September/09  Surgeon:  Dr. Almaraz  Procedure:  Laparoscopic cholecystectomy  Pathology, reviewed with patient:  inflammation, stones    He is now almost 3 weeks postop.   He is feeling well and denies incision concerns.   Diet:  Regular  Bowel function: Normal  Pt concerns:  none    Pt recommended to call office at any time if ongoing questions/concerns during recovery, but otherwise may follow-up on a prn basis.  Kendell is in agreement with this plan.    Ellen Garcia PA-C    Please route or send letter to:  Primary Care Provider (PCP)

## 2023-10-09 ENCOUNTER — OFFICE VISIT (OUTPATIENT)
Dept: FAMILY MEDICINE | Facility: CLINIC | Age: 63
End: 2023-10-09
Payer: COMMERCIAL

## 2023-10-09 VITALS
HEART RATE: 94 BPM | WEIGHT: 195.2 LBS | DIASTOLIC BLOOD PRESSURE: 76 MMHG | BODY MASS INDEX: 28.91 KG/M2 | SYSTOLIC BLOOD PRESSURE: 120 MMHG | RESPIRATION RATE: 20 BRPM | HEIGHT: 69 IN | TEMPERATURE: 98.4 F | OXYGEN SATURATION: 96 %

## 2023-10-09 DIAGNOSIS — R06.09 DYSPNEA ON EXERTION: ICD-10-CM

## 2023-10-09 DIAGNOSIS — I48.0 PAROXYSMAL ATRIAL FIBRILLATION (H): Primary | ICD-10-CM

## 2023-10-09 DIAGNOSIS — K21.00 GASTROESOPHAGEAL REFLUX DISEASE WITH ESOPHAGITIS WITHOUT HEMORRHAGE: ICD-10-CM

## 2023-10-09 DIAGNOSIS — K85.10 GALL STONE PANCREATITIS: ICD-10-CM

## 2023-10-09 DIAGNOSIS — Z13.220 SCREENING CHOLESTEROL LEVEL: ICD-10-CM

## 2023-10-09 LAB
BASO+EOS+MONOS # BLD AUTO: ABNORMAL 10*3/UL
BASO+EOS+MONOS NFR BLD AUTO: ABNORMAL %
BASOPHILS # BLD AUTO: 0.1 10E3/UL (ref 0–0.2)
BASOPHILS NFR BLD AUTO: 1 %
EOSINOPHIL # BLD AUTO: 0.6 10E3/UL (ref 0–0.7)
EOSINOPHIL NFR BLD AUTO: 5 %
ERYTHROCYTE [DISTWIDTH] IN BLOOD BY AUTOMATED COUNT: 11.9 % (ref 10–15)
HBA1C MFR BLD: 5.4 % (ref 0–5.6)
HCT VFR BLD AUTO: 46 % (ref 40–53)
HGB BLD-MCNC: 16.1 G/DL (ref 13.3–17.7)
IMM GRANULOCYTES # BLD: 0 10E3/UL
IMM GRANULOCYTES NFR BLD: 0 %
LYMPHOCYTES # BLD AUTO: 4.4 10E3/UL (ref 0.8–5.3)
LYMPHOCYTES NFR BLD AUTO: 39 %
MCH RBC QN AUTO: 32 PG (ref 26.5–33)
MCHC RBC AUTO-ENTMCNC: 35 G/DL (ref 31.5–36.5)
MCV RBC AUTO: 92 FL (ref 78–100)
MONOCYTES # BLD AUTO: 1.1 10E3/UL (ref 0–1.3)
MONOCYTES NFR BLD AUTO: 10 %
NEUTROPHILS # BLD AUTO: 5 10E3/UL (ref 1.6–8.3)
NEUTROPHILS NFR BLD AUTO: 45 %
PLATELET # BLD AUTO: 262 10E3/UL (ref 150–450)
RBC # BLD AUTO: 5.03 10E6/UL (ref 4.4–5.9)
WBC # BLD AUTO: 11.2 10E3/UL (ref 4–11)

## 2023-10-09 PROCEDURE — 83880 ASSAY OF NATRIURETIC PEPTIDE: CPT

## 2023-10-09 PROCEDURE — 85025 COMPLETE CBC W/AUTO DIFF WBC: CPT

## 2023-10-09 PROCEDURE — 83690 ASSAY OF LIPASE: CPT

## 2023-10-09 PROCEDURE — 36415 COLL VENOUS BLD VENIPUNCTURE: CPT

## 2023-10-09 PROCEDURE — 99204 OFFICE O/P NEW MOD 45 MIN: CPT | Mod: 25

## 2023-10-09 PROCEDURE — 80061 LIPID PANEL: CPT

## 2023-10-09 PROCEDURE — 80053 COMPREHEN METABOLIC PANEL: CPT

## 2023-10-09 PROCEDURE — 93000 ELECTROCARDIOGRAM COMPLETE: CPT

## 2023-10-09 PROCEDURE — 83036 HEMOGLOBIN GLYCOSYLATED A1C: CPT

## 2023-10-09 PROCEDURE — 83615 LACTATE (LD) (LDH) ENZYME: CPT

## 2023-10-09 RX ORDER — METOPROLOL TARTRATE 25 MG/1
25 TABLET, FILM COATED ORAL 2 TIMES DAILY
Qty: 180 TABLET | Refills: 0 | Status: SHIPPED | OUTPATIENT
Start: 2023-10-09 | End: 2024-01-05

## 2023-10-09 NOTE — PATIENT INSTRUCTIONS
Start xarelto (blood thinner)  Start metoprolol (heart rate control)   Start with half tablet twice daily, increase to 1 tablet twice daily if not having any dizziness, lightheadedness.     Check BP at home occasionally    Schedule nuclear med stress  Schedule cardiology appointment    Schedule follow up with me - ANNUAL PHYSICAL to review labs and recommended screenings due

## 2023-10-09 NOTE — PROGRESS NOTES
Assessment & Plan     Paroxysmal atrial fibrillation (H)  Acute/New diagnosis. EKG today shows atrial fibrillation, normal rate. Pt asymptomatic at rest and with conversation. Endorses HUITRON, so stress test ordered. Diagnosed during hospitalization 9/7/23 for abd pain (cholelithiasis/pancreatitis, s/p lap deandra 9/9) during which he converted to NSR without intervention. Discharged w Holter, results with 56% AF burden with avg rate 112 (). Starting xarelto for VTE prophylaxis and metoprolol for rate control.   Echo during hospitalization 09/23, normal EF, valve function, no thrombus or KARLIE noted.    - NM Lexiscan stress test  - Adult Cardiology [facility] Referral  - rivaroxaban ANTICOAGULANT (XARELTO) 20 MG TABS tablet  Dispense: 90 tablet; Refill: 0  - metoprolol tartrate (LOPRESSOR) 25 MG tablet  Dispense: 180 tablet; Refill: 0  - Home Blood Pressure Monitor Order for DME - ONLY FOR DME  - CBC with platelets and differential  - BNP-N terminal pro  - Lactate Dehydrogenase  - EKG 12-lead complete w/read - Clinics    Gastroesophageal reflux disease with esophagitis without hemorrhage    - Chronic, not currently treated regularly. Discussed importance of PPI consistency while taking anticoagulation for VTE prevention r/t atrial fibrillation.    Dyspnea on exertion  Pt uncertain on history/progression. 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 Denies chest pain or palpitations.    Gall stone pancreatitis  Historical/resolved s/p lap deandra. Rechecking labs.   - Comprehensive metabolic panel (BMP + Alb, Alk Phos, ALT, AST, Total. Bili, TP)  - Lipase  - Hemoglobin A1c    Screening cholesterol level  - Lipid panel reflex to direct LDL Non-fasting        Review of external notes as documented elsewhere in note  Review of the result(s) of each unique test - home heart monitor  Ordering of each  "unique test  Prescription drug management         BMI:   Estimated body mass index is 29.25 kg/m  as calculated from the following:    Height as of this encounter: 1.74 m (5' 8.5\").    Weight as of this encounter: 88.5 kg (195 lb 3.2 oz).       See Patient Instructions    CARLOTA Sen CNP [facility] TASHI Rdz is a 63 year old, presenting for the following health issues:  Establish Care and Atrial Fib        10/9/2023     3:59 PM   Additional Questions   Roomed by Derick ANGEL MA   Accompanied by Partner   New to me.     History of Present Illness       He eats 0-1 servings of fruits and vegetables daily.He consumes 3 sweetened beverage(s) daily.He exercises with enough effort to increase his heart rate 9 or less minutes per day.  He exercises with enough effort to increase his heart rate 3 or less days per week.   He is taking medications regularly.     Establish care, discuss atrial fibrillation, tachycardia.       History of food getting suck in his throat.   EGD showed signs of EOE. Not currently following with GI or taking any medication  Pt used to drink mountain dew 6-9 cans a day, stopped 1 month ago.       Recent hospitalization 9/7-9/10 abdominal pain found to have gallstone pancreatitis, choledocholithiasis. S/P laparoscopic cholecystectomy on 9/9/23     Episode of Atrial fibrillation in ER converted to NSR overnight 9/8 without intervention.   Echo 9/7/23 left ventricle is normal in size. There is normal left ventricular wallthickness. There is concentric remodeling present. Left ventricular systolicfunction is normal. The visual ejection fraction is 60-65%. No regional wallmotion abnormalities noted.The left atrium is mildly dilated   Ziopatch results below    TSH normal 9/8          Fv Hpi General Questionnaire  Question 10/9/2023  3:55 PM CDT - Filed by Patient   I understand that completing this form is intended to provide my doctor and/or " "care team with helpful information for my upcoming clinic visit. It is not to notify my doctor and/or care team of medical matters requiring urgent attention. If I have an urgent medical matter, I should call 911 or my doctor's office. Acknowledge   Please select a reason for your visit: Follow-up for health condition I've been seen for in the past   What health condition or conditions are you coming in for today? Atrial Fibrillation   How many servings of fruits and vegetables do you eat daily? 0-1   On average, how many sweetened beverages do you drink each day (Examples: soda, juice, sweet tea, etc.  Do NOT count diet or artificially sweetened beverages)? 3   How many minutes a day do you exercise enough to make your heart beat faster? 9 or less   How many days a week do you exercise enough to make your heart beat faster? 3 or less   How many days per week do you miss taking your medication? 0     Fv Hpi Afib  Question 10/9/2023  3:55 PM CDT - Filed by Patient   Are you having any of the following symptoms ? (Select all that apply) Palpitations    Shortness of breath   Are you taking any of the following medications or had a procedure to prevent stroke ? None                 Review of Systems   Constitutional, HEENT, cardiovascular, pulmonary, gi and gu systems are negative, except as otherwise noted.      Objective    /76 (BP Location: Right arm, Patient Position: Chair, Cuff Size: Adult Large)   Pulse 94   Temp 98.4  F (36.9  C) (Oral)   Resp 20   Ht 1.74 m (5' 8.5\")   Wt 88.5 kg (195 lb 3.2 oz)   SpO2 96%   BMI 29.25 kg/m    Body mass index is 29.25 kg/m .  Physical Exam   GENERAL: healthy, alert and no distress  NECK: no adenopathy, no asymmetry, masses, or scars and thyroid normal to palpation  RESP: lungs clear to auscultation - no rales, rhonchi or wheezes  CV: normal rate, irregular rhythm, no murmur, click or rub, no peripheral edema and peripheral pulses strong  ABDOMEN: soft, nontender, no " hepatosplenomegaly, no masses and bowel sounds normal  MS: no gross musculoskeletal defects noted, no edema    Results for orders placed or performed in visit on 10/09/23   Lipid panel reflex to direct LDL Non-fasting     Status: Abnormal   Result Value Ref Range    Cholesterol 130 <200 mg/dL    Triglycerides 166 (H) <150 mg/dL    Direct Measure HDL 35 (L) >=40 mg/dL    LDL Cholesterol Calculated 62 <=100 mg/dL    Non HDL Cholesterol 95 <130 mg/dL    Narrative    Cholesterol  Desirable:  <200 mg/dL    Triglycerides  Normal:  Less than 150 mg/dL  Borderline High:  150-199 mg/dL  High:  200-499 mg/dL  Very High:  Greater than or equal to 500 mg/dL    Direct Measure HDL  Female:  Greater than or equal to 50 mg/dL   Male:  Greater than or equal to 40 mg/dL    LDL Cholesterol  Desirable:  <100mg/dL  Above Desirable:  100-129 mg/dL   Borderline High:  130-159 mg/dL   High:  160-189 mg/dL   Very High:  >= 190 mg/dL    Non HDL Cholesterol  Desirable:  130 mg/dL  Above Desirable:  130-159 mg/dL  Borderline High:  160-189 mg/dL  High:  190-219 mg/dL  Very High:  Greater than or equal to 220 mg/dL   Comprehensive metabolic panel (BMP + Alb, Alk Phos, ALT, AST, Total. Bili, TP)     Status: Normal   Result Value Ref Range    Sodium 139 135 - 145 mmol/L    Potassium 4.7 3.4 - 5.3 mmol/L    Carbon Dioxide (CO2) 25 22 - 29 mmol/L    Anion Gap 12 7 - 15 mmol/L    Urea Nitrogen 17.9 8.0 - 23.0 mg/dL    Creatinine 0.97 0.67 - 1.17 mg/dL    GFR Estimate 89 >60 mL/min/1.73m2    Calcium 9.9 8.8 - 10.2 mg/dL    Chloride 102 98 - 107 mmol/L    Glucose 74 70 - 99 mg/dL    Alkaline Phosphatase 89 40 - 129 U/L    AST 27 0 - 45 U/L    ALT 16 0 - 70 U/L    Protein Total 7.6 6.4 - 8.3 g/dL    Albumin 4.4 3.5 - 5.2 g/dL    Bilirubin Total 0.4 <=1.2 mg/dL   Lipase     Status: Normal   Result Value Ref Range    Lipase 34 13 - 60 U/L   Hemoglobin A1c     Status: Normal   Result Value Ref Range    Hemoglobin A1C 5.4 0.0 - 5.6 %   BNP-N terminal pro      Status: Normal   Result Value Ref Range    N Terminal Pro BNP Outpatient 601 0 - 900 pg/mL   Lactate Dehydrogenase     Status: Normal   Result Value Ref Range    Lactate Dehydrogenase 236 0 - 250 U/L   CBC with platelets and differential     Status: Abnormal   Result Value Ref Range    WBC Count 11.2 (H) 4.0 - 11.0 10e3/uL    RBC Count 5.03 4.40 - 5.90 10e6/uL    Hemoglobin 16.1 13.3 - 17.7 g/dL    Hematocrit 46.0 40.0 - 53.0 %    MCV 92 78 - 100 fL    MCH 32.0 26.5 - 33.0 pg    MCHC 35.0 31.5 - 36.5 g/dL    RDW 11.9 10.0 - 15.0 %    Platelet Count 262 150 - 450 10e3/uL    % Neutrophils 45 %    % Lymphocytes 39 %    % Monocytes 10 %    Mids % (Monos, Eos, Basos)      % Eosinophils 5 %    % Basophils 1 %    % Immature Granulocytes 0 %    Absolute Neutrophils 5.0 1.6 - 8.3 10e3/uL    Absolute Lymphocytes 4.4 0.8 - 5.3 10e3/uL    Absolute Monocytes 1.1 0.0 - 1.3 10e3/uL    Mids Abs (Monos, Eos, Basos)      Absolute Eosinophils 0.6 0.0 - 0.7 10e3/uL    Absolute Basophils 0.1 0.0 - 0.2 10e3/uL    Absolute Immature Granulocytes 0.0 <=0.4 10e3/uL   CBC with platelets and differential     Status: Abnormal    Narrative    The following orders were created for panel order CBC with platelets and differential.  Procedure                               Abnormality         Status                     ---------                               -----------         ------                     CBC with platelets and d...[[id number]]  Abnormal            Final result                 Please view results for these tests on the individual orders.

## 2023-10-10 ENCOUNTER — TELEPHONE (OUTPATIENT)
Dept: FAMILY MEDICINE | Facility: CLINIC | Age: 63
End: 2023-10-10
Payer: COMMERCIAL

## 2023-10-10 LAB
ALBUMIN SERPL BCG-MCNC: 4.4 G/DL (ref 3.5–5.2)
ALP SERPL-CCNC: 89 U/L (ref 40–129)
ALT SERPL W P-5'-P-CCNC: 16 U/L (ref 0–70)
ANION GAP SERPL CALCULATED.3IONS-SCNC: 12 MMOL/L (ref 7–15)
AST SERPL W P-5'-P-CCNC: 27 U/L (ref 0–45)
BILIRUB SERPL-MCNC: 0.4 MG/DL
BUN SERPL-MCNC: 17.9 MG/DL (ref 8–23)
CALCIUM SERPL-MCNC: 9.9 MG/DL (ref 8.8–10.2)
CHLORIDE SERPL-SCNC: 102 MMOL/L (ref 98–107)
CHOLEST SERPL-MCNC: 130 MG/DL
CREAT SERPL-MCNC: 0.97 MG/DL (ref 0.67–1.17)
DEPRECATED HCO3 PLAS-SCNC: 25 MMOL/L (ref 22–29)
EGFRCR SERPLBLD CKD-EPI 2021: 89 ML/MIN/1.73M2
GLUCOSE SERPL-MCNC: 74 MG/DL (ref 70–99)
HDLC SERPL-MCNC: 35 MG/DL
LDH SERPL L TO P-CCNC: 236 U/L (ref 0–250)
LDLC SERPL CALC-MCNC: 62 MG/DL
LIPASE SERPL-CCNC: 34 U/L (ref 13–60)
NONHDLC SERPL-MCNC: 95 MG/DL
NT-PROBNP SERPL-MCNC: 601 PG/ML (ref 0–900)
POTASSIUM SERPL-SCNC: 4.7 MMOL/L (ref 3.4–5.3)
PROT SERPL-MCNC: 7.6 G/DL (ref 6.4–8.3)
SODIUM SERPL-SCNC: 139 MMOL/L (ref 135–145)
TRIGL SERPL-MCNC: 166 MG/DL

## 2023-10-10 NOTE — TELEPHONE ENCOUNTER
Routing to Provider    Pt calling, was seen 10/9- was prescribed Xarelto for Afib but is not covered by insurance 500$ for 30 day supply.    Called pharmacy on behalf of patient to see if there are covered alternatives.  Pharmacy states they do not have an updated insurance card on file. Poharmacy states he is waiting for pt to get information.  Once pharmacy received this, if xarelto is not covered, pharmacy will send a message including covered alternatives.      Called pt back and let him know, he should call insurance company right away to check on what is needed.        Batsheva, RN    Triage Nurse  [facility]

## 2023-10-12 ENCOUNTER — TELEPHONE (OUTPATIENT)
Dept: FAMILY MEDICINE | Facility: CLINIC | Age: 63
End: 2023-10-12
Payer: COMMERCIAL

## 2023-10-12 DIAGNOSIS — Z59.9 FINANCIAL DIFFICULTIES: ICD-10-CM

## 2023-10-12 DIAGNOSIS — K85.10 GALL STONE PANCREATITIS: ICD-10-CM

## 2023-10-12 DIAGNOSIS — I48.91 NEW ONSET ATRIAL FIBRILLATION (H): Primary | ICD-10-CM

## 2023-10-12 SDOH — ECONOMIC STABILITY - INCOME SECURITY: PROBLEM RELATED TO HOUSING AND ECONOMIC CIRCUMSTANCES, UNSPECIFIED: Z59.9

## 2023-10-12 NOTE — TELEPHONE ENCOUNTER
[id number] faxed Patient Requests New Rx re: rivaroxaban ANTICOAGULANT (XARELTO) 20 MG TABS tablet     PHARMACY COMMENTS:  Any alternatives to the XARELTO, cost is 4185.    Thank you,  [facility] Pharmacist

## 2023-10-12 NOTE — TELEPHONE ENCOUNTER
RN called patient/family and relayed provider's message. Patient/family verbalized understanding.     His stress test is not covered, it would be $5030 out of pocket. He is going to talk with insurance about this.     Camryn Huang RN, BSN  [facility] [place]

## 2023-10-12 NOTE — TELEPHONE ENCOUNTER
Called patient and discussed over the phone 10/12 at 5:45 PM.    Pt has appointments on 10/16 with cardiology. stress test scheduled at 7:00 AM followed by consultation with cardiologist at 12:00 PM.     Pt notes his SOB/HUITRON has improved since starting metoprolol and his HR readings at home have been mostly 50-60's.     Pt reports his significant other did  the xarelto prior Rx despite the cost, and he started it today.     Due to cost of stress test, I will reach out to cardiology clinic and check if patient could have cardiologist consultation first to help determine whether or not stress test is the best next step / whether or not stress test is urgent.    Discussed care coordinator referral as patient has significant financial strain and concern about paying for his treatment and past hospital stay.

## 2023-10-13 ENCOUNTER — PATIENT OUTREACH (OUTPATIENT)
Dept: CARE COORDINATION | Facility: CLINIC | Age: 63
End: 2023-10-13
Payer: COMMERCIAL

## 2023-10-13 ENCOUNTER — TELEPHONE (OUTPATIENT)
Dept: FAMILY MEDICINE | Facility: CLINIC | Age: 63
End: 2023-10-13
Payer: COMMERCIAL

## 2023-10-13 NOTE — TELEPHONE ENCOUNTER
Please call pt. Let him know I spoke with cardiologist he will see on Monday. He thinks is it reasonable to hold off on the stress test. Pt can skip morning testing and go to cardiology consult appt with Dr. Ring on 10/16, they will discuss need for additional testing at that visit.

## 2023-10-13 NOTE — TELEPHONE ENCOUNTER
RN huddled with Nathan Lockwood.    Cancell all 4 appointment.    Al Marin RN, BSN, PHN  [facility]

## 2023-10-13 NOTE — TELEPHONE ENCOUNTER
Routing to Nathan Lockwood.    RN seeking clarification.    Appears patient has 4 different morning appointments on 10/16    Are all appointments to be cancelled except for the consult?    RN called and spoke with Patient.    RN advised patient did not need stress test and go to consult only.    Patient verbalized understanding.    Al Marin, RN, BSN, PHN  [facility]

## 2023-10-13 NOTE — PROGRESS NOTES
[facility]/Voicemail       Clinical Data: Care Coordinator Outreach  Outreach attempted x 1.  Left message on patient's voicemail with call back information and requested return call.  Plan:   Care Coordinator will try to reach patient again in 1-2 business days.    GABE Segundo [place], Lonnie Tovar [place] and [facility]  [phone]

## 2023-10-16 ENCOUNTER — OFFICE VISIT (OUTPATIENT)
Dept: CARDIOLOGY | Facility: CLINIC | Age: 63
End: 2023-10-16
Payer: COMMERCIAL

## 2023-10-16 VITALS
HEART RATE: 58 BPM | HEIGHT: 69 IN | WEIGHT: 200 LBS | DIASTOLIC BLOOD PRESSURE: 71 MMHG | BODY MASS INDEX: 29.62 KG/M2 | OXYGEN SATURATION: 97 % | SYSTOLIC BLOOD PRESSURE: 110 MMHG

## 2023-10-16 DIAGNOSIS — I48.0 PAROXYSMAL ATRIAL FIBRILLATION (H): ICD-10-CM

## 2023-10-16 PROCEDURE — 99204 OFFICE O/P NEW MOD 45 MIN: CPT | Performed by: INTERNAL MEDICINE

## 2023-10-16 RX ORDER — METOPROLOL SUCCINATE 25 MG/1
12.5 TABLET, EXTENDED RELEASE ORAL DAILY
Qty: 60 TABLET | Refills: 3 | Status: SHIPPED | OUTPATIENT
Start: 2023-10-16

## 2023-10-16 NOTE — PROGRESS NOTES
Community Health Worker Initial Outreach    CHW Initial Information Gathering:  Referral Source: PCP  Preferred Hospital: [facility]  [phone]  Preferred Urgent Care: Other  Current living arrangement:: I live in a private home with spouse  Type of residence:: Private home - stairs  Community Resources: None  Supplies Currently Used at Home: None  Equipment Currently Used at Home: none  Informal Support system:: Spouse  No PCP office visit in Past Year: No  Transportation means:: Regular car  CHW Additional Questions  If ED/Hospital discharge, follow-up appointment scheduled as recommended?: N/A  Medication changes made following ED/Hospital discharge?: N/A  MyChart active?: Yes  Patient sent Social Determinants of Health questionnaire?: Yes    Patient accepts CC: Yes. Patient scheduled for assessment with CC KAYLEE on 10/19 at 10 am. Patient noted desire to discuss medication affordability or finding insurance plan that covers his medications.     GABE Segundo [place], Lonnie Tovar [place] and [facility]  [phone]

## 2023-10-16 NOTE — PROGRESS NOTES
HPI and Plan:   Very pleasant 63-year-old gentleman here for evaluation of paroxysmal atrial fibrillation.    He has been seen in the emergency room this year for chest pain and epigastric pain.  He was diagnosed with choledocholithiasis and gallstone pancreatitis and had a laparoscopic cholecystectomy.  During hospitalization he was found to be in paroxysmal atrial fibrillation.    Since his cholecystectomy his chest pain is completely resolved.  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  He has no symptoms of angina or heart failure.    He does not smoke abuse alcohol or drugs.  He has significantly cut down on the use of caffeinated products which in any case I do not think he has much to do with paroxysmal atrial fibrillation.  He is not diabetic hypertensive and there is no history of thromboembolic disease.  Family history is negative for atrial fibrillation    His cardiac examination is normal    Holter monitor showed 57% atrial fibrillation burden, fastest rate was 214.  At this point this he was completely asymptomatic.  Since starting on low-dose beta-blockers his breathing has actually improved.    I personally reviewed his echocardiogram which showed mild left atrial enlargement, mild aortic root dilatation and mild mitral regurgitation.  Was otherwise normal.    He had some EKGs which showed atrial fibrillation with mild nonspecific ST segment changes.  EKG with him in sinus rhythm was essentially normal.    Recent lab tests show normal electrolytes.  HbA1c is 5.4.  HDL at 35, TSH is normal.  CBC showed white cell count 11.2 is otherwise normal.    Summary this is a gentleman with paroxysmal atrial fibrillation with a [id number] score of 0.  Clinically he does not need to be on long-term oral anticoagulation.  He had blood thinning, though expensive is well-tolerated.  I would like to have his heart rate under better control first before stopping his oral  anticoagulation.    He is blood pressure today is 110/71 in clinic.  At home it is usually around these numbers as well.  I will therefore cautiously increase his metoprolol to 37.5 mg p.o. twice daily.  We will continue to monitor his blood pressures at home and if systolic is less than 95 and heart rate less than 50 instructed him to stop the medication.    I will have him repeat Holter monitor in about a month's time to see what his rhythm and rate profiles are.  I will see him again in about 2 to 3 months time for continued follow-up.    As he has no symptoms of angina with a normal EKG at baseline, I do not think we need to do stress testing just yet.            No orders of the defined types were placed in this encounter.      No orders of the defined types were placed in this encounter.      Encounter Diagnosis   Name Primary?    Paroxysmal atrial fibrillation (H)        CURRENT MEDICATIONS:  Current Outpatient Medications   Medication Sig Dispense Refill    acetaminophen (TYLENOL) 325 MG tablet Take 2 tablets (650 mg) by mouth every 6 hours as needed for other (For optimal non-opioid multimodal pain management to improve pain control.) (Patient not taking: Reported on 10/9/2023)      apixaban ANTICOAGULANT (ELIQUIS) 5 MG tablet Take 1 tablet (5 mg) by mouth 2 times daily 180 tablet 0    metoprolol tartrate (LOPRESSOR) 25 MG tablet Take 1 tablet (25 mg) by mouth 2 times daily for 90 days 180 tablet 0    pantoprazole (PROTONIX) 40 MG EC tablet Take 1 tablet (40 mg) by mouth daily (Patient not taking: Reported on 10/9/2023) 30 tablet 0       ALLERGIES     Allergies   Allergen Reactions    No Known Drug Allergy        PAST MEDICAL HISTORY:  Past Medical History:   Diagnosis Date    Cholelithiasis     Depression     Kidney stone     Paroxysmal A-fib (H) 09/07/2023    Unspecified viral hepatitis without mention of hepatic coma     as infant       PAST SURGICAL HISTORY:  Past Surgical History:   Procedure  Laterality Date    COMBINED CYSTOSCOPY, RETROGRADES, URETEROSCOPY, LASER HOLMIUM LITHOTRIPSY URETER(S), INSERT STENT Left 3/25/2016    Procedure: COMBINED CYSTOSCOPY, RETROGRADES, URETEROSCOPY, LASER HOLMIUM LITHOTRIPSY URETER(S), INSERT STENT;  Surgeon: Gen Funes MD;  Location: [facility] OR    ENDOSCOPIC RETROGRADE CHOLANGIOPANCREATOGRAM N/A 9/8/2023    Procedure: ENDOSCOPIC RETROGRADE CHOLANGIOPANCREATOGRAPHY;  Surgeon: Craig Pfeiffer MD;  Location: [facility] OR    LAPAROSCOPIC CHOLECYSTECTOMY N/A 9/9/2023    Procedure: CHOLECYSTECTOMY, LAPAROSCOPIC;  Surgeon: Marco Almaraz MD;  Location: [facility] OR    REMV PILONIDAL LESION SIMPLE         FAMILY HISTORY:  Family History   Problem Relation Age of Onset    Alzheimer Disease No family hx of     Cancer Paternal Aunt         colon    Cancer Paternal Aunt         coloc    Diabetes No family hx of     Heart Disease No family hx of     Cerebrovascular Disease Brother         age 40    Cancer Father     Cancer Paternal Grandfather        SOCIAL HISTORY:  Social History     Socioeconomic History    Marital status:    Tobacco Use    Smoking status: Never    Smokeless tobacco: Never   Substance and Sexual Activity    Alcohol use: No    Drug use: No     Comment: drinks quite a bit of caffeine    Sexual activity: Yes     Partners: Female     Social Determinants of Health     Financial Resource Strain: Low Risk  (10/9/2023)    Financial Resource Strain     Within the past 12 months, have you or your family members you live with been unable to get utilities (heat, electricity) when it was really needed?: No   Food Insecurity: Low Risk  (10/9/2023)    Food Insecurity     Within the past 12 months, did you worry that your food would run out before you got money to buy more?: No     Within the past 12 months, did the food you bought just not last and you didn t have money to get more?: No   Transportation Needs: Low Risk  (10/9/2023)    Transportation Needs      "Within the past 12 months, has lack of transportation kept you from medical appointments, getting your medicines, non-medical meetings or appointments, work, or from getting things that you need?: No   Housing Stability: Low Risk  (10/9/2023)    Housing Stability     Do you have housing? : Yes     Are you worried about losing your housing?: No       Review of Systems:  Skin:        Eyes:       ENT:       Respiratory:       Cardiovascular:       Gastroenterology:      Genitourinary:       Musculoskeletal:       Neurologic:       Psychiatric:       Heme/Lymph/Imm:       Endocrine:         Physical Exam:  Vitals: Ht 1.74 m (5' 8.5\")   Wt 90.7 kg (200 lb)   BMI 29.97 kg/m      Constitutional:  cooperative, alert and oriented, well developed, well nourished, in no acute distress        Skin:  warm and dry to the touch, no apparent skin lesions or masses noted          Head:  normocephalic, no masses or lesions        Eyes:  pupils equal and round, conjunctivae and lids unremarkable, sclera white, no xanthalasma, EOMS intact, no nystagmus        Lymph:No Cervical lymphadenopathy present     ENT:  no pallor or cyanosis, dentition good        Neck:  carotid pulses are full and equal bilaterally, JVP normal, no carotid bruit        Respiratory:  normal breath sounds, clear to auscultation, normal A-P diameter, normal symmetry, normal respiratory excursion, no use of accessory muscles         Cardiac: regular rhythm, normal S1/S2, no S3 or S4, apical impulse not displaced, no murmurs, gallops or rubs                pulses full and equal, no bruits auscultated                                        GI:  abdomen soft, non-tender, BS normoactive, no mass, no HSM, no bruits        Extremities and Muscular Skeletal:  no deformities, clubbing, cyanosis, erythema observed              Neurological:  no gross motor deficits        Psych:  Alert and Oriented x 3        Recent Lab Results:  LIPID RESULTS:  Lab Results   Component " Value Date    CHOL 130 10/09/2023    HDL 35 (L) 10/09/2023    LDL 62 10/09/2023    TRIG 166 (H) 10/09/2023       LIVER ENZYME RESULTS:  Lab Results   Component Value Date    AST 27 10/09/2023    ALT 16 10/09/2023       CBC RESULTS:  Lab Results   Component Value Date    WBC 11.2 (H) 10/09/2023    WBC 8.6 03/24/2016    RBC 5.03 10/09/2023    RBC 4.60 03/24/2016    HGB 16.1 10/09/2023    HGB 14.6 03/24/2016    HCT 46.0 10/09/2023    HCT 41.0 03/24/2016    MCV 92 10/09/2023    MCV 89 03/24/2016    MCH 32.0 10/09/2023    MCH 31.7 03/24/2016    MCHC 35.0 10/09/2023    MCHC 35.6 03/24/2016    RDW 11.9 10/09/2023    RDW 11.7 03/24/2016     10/09/2023     03/24/2016       BMP RESULTS:  Lab Results   Component Value Date     10/09/2023     03/25/2016    POTASSIUM 4.7 10/09/2023    POTASSIUM 3.8 03/25/2016    CHLORIDE 102 10/09/2023    CHLORIDE 110 (H) 03/25/2016    CO2 25 10/09/2023    CO2 24 03/25/2016    ANIONGAP 12 10/09/2023    ANIONGAP 5 03/25/2016    GLC 74 10/09/2023    GLC 91 09/08/2023    GLC 96 03/25/2016    BUN 17.9 10/09/2023    BUN 19 03/25/2016    CR 0.97 10/09/2023    CR 1.00 03/25/2016    GFRESTIMATED 89 10/09/2023    GFRESTIMATED 78 03/25/2016    GFRESTBLACK >90   GFR Calc   03/25/2016    NUBIA 9.9 10/09/2023    NUBIA 7.6 (L) 03/25/2016        A1C RESULTS:  Lab Results   Component Value Date    A1C 5.4 10/09/2023       INR RESULTS:  Lab Results   Component Value Date    INR 1.07 09/08/2023           CC  Nathan Lockwood APRN CNP  [address] [place] [address]

## 2023-10-16 NOTE — LETTER
10/16/2023    Physician No Ref-Primary  No address on file    RE: Kendell Caban       Dear Colleague,     I had the pleasure of seeing Kendell Caban in the [facility].  HPI and Plan:   Very pleasant 63-year-old gentleman here for evaluation of paroxysmal atrial fibrillation.    He has been seen in the emergency room this year for chest pain and epigastric pain.  He was diagnosed with choledocholithiasis and gallstone pancreatitis and had a laparoscopic cholecystectomy.  During hospitalization he was found to be in paroxysmal atrial fibrillation.    Since his cholecystectomy his chest pain is completely resolved.  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  He has no symptoms of angina or heart failure.    He does not smoke abuse alcohol or drugs.  He has significantly cut down on the use of caffeinated products which in any case I do not think he has much to do with paroxysmal atrial fibrillation.  He is not diabetic hypertensive and there is no history of thromboembolic disease.  Family history is negative for atrial fibrillation    His cardiac examination is normal    Holter monitor showed 57% atrial fibrillation burden, fastest rate was 214.  At this point this he was completely asymptomatic.  Since starting on low-dose beta-blockers his breathing has actually improved.    I personally reviewed his echocardiogram which showed mild left atrial enlargement, mild aortic root dilatation and mild mitral regurgitation.  Was otherwise normal.    He had some EKGs which showed atrial fibrillation with mild nonspecific ST segment changes.  EKG with him in sinus rhythm was essentially normal.    Recent lab tests show normal electrolytes.  HbA1c is 5.4.  HDL at 35, TSH is normal.  CBC showed white cell count 11.2 is otherwise normal.    Summary this is a gentleman with paroxysmal atrial fibrillation with a XDH2YX1-XYSv score of 0.  Clinically he does not need to be on  long-term oral anticoagulation.  He had blood thinning, though expensive is well-tolerated.  I would like to have his heart rate under better control first before stopping his oral anticoagulation.    He is blood pressure today is 110/71 in clinic.  At home it is usually around these numbers as well.  I will therefore cautiously increase his metoprolol to 37.5 mg p.o. twice daily.  We will continue to monitor his blood pressures at home and if systolic is less than 95 and heart rate less than 50 instructed him to stop the medication.    I will have him repeat Holter monitor in about a month's time to see what his rhythm and rate profiles are.  I will see him again in about 2 to 3 months time for continued follow-up.    As he has no symptoms of angina with a normal EKG at baseline, I do not think we need to do stress testing just yet.            No orders of the defined types were placed in this encounter.      No orders of the defined types were placed in this encounter.      Encounter Diagnosis   Name Primary?    Paroxysmal atrial fibrillation (H)        CURRENT MEDICATIONS:  Current Outpatient Medications   Medication Sig Dispense Refill    acetaminophen (TYLENOL) 325 MG tablet Take 2 tablets (650 mg) by mouth every 6 hours as needed for other (For optimal non-opioid multimodal pain management to improve pain control.) (Patient not taking: Reported on 10/9/2023)      apixaban ANTICOAGULANT (ELIQUIS) 5 MG tablet Take 1 tablet (5 mg) by mouth 2 times daily 180 tablet 0    metoprolol tartrate (LOPRESSOR) 25 MG tablet Take 1 tablet (25 mg) by mouth 2 times daily for 90 days 180 tablet 0    pantoprazole (PROTONIX) 40 MG EC tablet Take 1 tablet (40 mg) by mouth daily (Patient not taking: Reported on 10/9/2023) 30 tablet 0       ALLERGIES     Allergies   Allergen Reactions    No Known Drug Allergy        PAST MEDICAL HISTORY:  Past Medical History:   Diagnosis Date    Cholelithiasis     Depression     Kidney stone      Paroxysmal A-fib (H) 09/07/2023    Unspecified viral hepatitis without mention of hepatic coma     as infant       PAST SURGICAL HISTORY:  Past Surgical History:   Procedure Laterality Date    COMBINED CYSTOSCOPY, RETROGRADES, URETEROSCOPY, LASER HOLMIUM LITHOTRIPSY URETER(S), INSERT STENT Left 3/25/2016    Procedure: COMBINED CYSTOSCOPY, RETROGRADES, URETEROSCOPY, LASER HOLMIUM LITHOTRIPSY URETER(S), INSERT STENT;  Surgeon: Gen Funes MD;  Location: [facility] OR    ENDOSCOPIC RETROGRADE CHOLANGIOPANCREATOGRAM N/A 9/8/2023    Procedure: ENDOSCOPIC RETROGRADE CHOLANGIOPANCREATOGRAPHY;  Surgeon: Craig Pfeiffer MD;  Location: [facility] OR    LAPAROSCOPIC CHOLECYSTECTOMY N/A 9/9/2023    Procedure: CHOLECYSTECTOMY, LAPAROSCOPIC;  Surgeon: Marco Almaraz MD;  Location: [facility] OR    REMV PILONIDAL LESION SIMPLE         FAMILY HISTORY:  Family History   Problem Relation Age of Onset    Alzheimer Disease No family hx of     Cancer Paternal Aunt         colon    Cancer Paternal Aunt         coloc    Diabetes No family hx of     Heart Disease No family hx of     Cerebrovascular Disease Brother         age 40    Cancer Father     Cancer Paternal Grandfather        SOCIAL HISTORY:  Social History     Socioeconomic History    Marital status:    Tobacco Use    Smoking status: Never    Smokeless tobacco: Never   Substance and Sexual Activity    Alcohol use: No    Drug use: No     Comment: drinks quite a bit of caffeine    Sexual activity: Yes     Partners: Female     Social Determinants of Health     Financial Resource Strain: Low Risk  (10/9/2023)    Financial Resource Strain     Within the past 12 months, have you or your family members you live with been unable to get utilities (heat, electricity) when it was really needed?: No   Food Insecurity: Low Risk  (10/9/2023)    Food Insecurity     Within the past 12 months, did you worry that your food would run out before you got money to buy more?: No     Within the  "past 12 months, did the food you bought just not last and you didn t have money to get more?: No   Transportation Needs: Low Risk  (10/9/2023)    Transportation Needs     Within the past 12 months, has lack of transportation kept you from medical appointments, getting your medicines, non-medical meetings or appointments, work, or from getting things that you need?: No   Housing Stability: Low Risk  (10/9/2023)    Housing Stability     Do you have housing? : Yes     Are you worried about losing your housing?: No       Review of Systems:  Skin:        Eyes:       ENT:       Respiratory:       Cardiovascular:       Gastroenterology:      Genitourinary:       Musculoskeletal:       Neurologic:       Psychiatric:       Heme/Lymph/Imm:       Endocrine:         Physical Exam:  Vitals: Ht 1.74 m (5' 8.5\")   Wt 90.7 kg (200 lb)   BMI 29.97 kg/m      Constitutional:  cooperative, alert and oriented, well developed, well nourished, in no acute distress        Skin:  warm and dry to the touch, no apparent skin lesions or masses noted          Head:  normocephalic, no masses or lesions        Eyes:  pupils equal and round, conjunctivae and lids unremarkable, sclera white, no xanthalasma, EOMS intact, no nystagmus        Lymph:No Cervical lymphadenopathy present     ENT:  no pallor or cyanosis, dentition good        Neck:  carotid pulses are full and equal bilaterally, JVP normal, no carotid bruit        Respiratory:  normal breath sounds, clear to auscultation, normal A-P diameter, normal symmetry, normal respiratory excursion, no use of accessory muscles         Cardiac: regular rhythm, normal S1/S2, no S3 or S4, apical impulse not displaced, no murmurs, gallops or rubs                pulses full and equal, no bruits auscultated                                        GI:  abdomen soft, non-tender, BS normoactive, no mass, no HSM, no bruits        Extremities and Muscular Skeletal:  no deformities, clubbing, cyanosis, erythema " observed              Neurological:  no gross motor deficits        Psych:  Alert and Oriented x 3        Recent Lab Results:  LIPID RESULTS:  Lab Results   Component Value Date    CHOL 130 10/09/2023    HDL 35 (L) 10/09/2023    LDL 62 10/09/2023    TRIG 166 (H) 10/09/2023       LIVER ENZYME RESULTS:  Lab Results   Component Value Date    AST 27 10/09/2023    ALT 16 10/09/2023       CBC RESULTS:  Lab Results   Component Value Date    WBC 11.2 (H) 10/09/2023    WBC 8.6 03/24/2016    RBC 5.03 10/09/2023    RBC 4.60 03/24/2016    HGB 16.1 10/09/2023    HGB 14.6 03/24/2016    HCT 46.0 10/09/2023    HCT 41.0 03/24/2016    MCV 92 10/09/2023    MCV 89 03/24/2016    MCH 32.0 10/09/2023    MCH 31.7 03/24/2016    MCHC 35.0 10/09/2023    MCHC 35.6 03/24/2016    RDW 11.9 10/09/2023    RDW 11.7 03/24/2016     10/09/2023     03/24/2016       BMP RESULTS:  Lab Results   Component Value Date     10/09/2023     03/25/2016    POTASSIUM 4.7 10/09/2023    POTASSIUM 3.8 03/25/2016    CHLORIDE 102 10/09/2023    CHLORIDE 110 (H) 03/25/2016    CO2 25 10/09/2023    CO2 24 03/25/2016    ANIONGAP 12 10/09/2023    ANIONGAP 5 03/25/2016    GLC 74 10/09/2023    GLC 91 09/08/2023    GLC 96 03/25/2016    BUN 17.9 10/09/2023    BUN 19 03/25/2016    CR 0.97 10/09/2023    CR 1.00 03/25/2016    GFRESTIMATED 89 10/09/2023    GFRESTIMATED 78 03/25/2016    GFRESTBLACK >90   GFR Calc   03/25/2016    NUBIA 9.9 10/09/2023    NUBIA 7.6 (L) 03/25/2016        A1C RESULTS:  Lab Results   Component Value Date    A1C 5.4 10/09/2023       INR RESULTS:  Lab Results   Component Value Date    INR 1.07 09/08/2023           CC  Nathan Lockwood, APRN CNP  [address] [id number]      Thank you for allowing me to participate in the care of your patient.      Sincerely,     DR SHELLEY GREGORIO MD     [facility]

## 2023-10-19 ENCOUNTER — PATIENT OUTREACH (OUTPATIENT)
Dept: NURSING | Facility: CLINIC | Age: 63
End: 2023-10-19
Payer: COMMERCIAL

## 2023-10-19 SDOH — HEALTH STABILITY: PHYSICAL HEALTH: ON AVERAGE, HOW MANY DAYS PER WEEK DO YOU ENGAGE IN MODERATE TO STRENUOUS EXERCISE (LIKE A BRISK WALK)?: 5 DAYS

## 2023-10-19 SDOH — HEALTH STABILITY: PHYSICAL HEALTH: ON AVERAGE, HOW MANY MINUTES DO YOU ENGAGE IN EXERCISE AT THIS LEVEL?: 50 MIN

## 2023-10-19 ASSESSMENT — SOCIAL DETERMINANTS OF HEALTH (SDOH)
DO YOU BELONG TO ANY CLUBS OR ORGANIZATIONS SUCH AS CHURCH GROUPS UNIONS, FRATERNAL OR ATHLETIC GROUPS, OR SCHOOL GROUPS?: PATIENT DECLINED
HOW OFTEN DO YOU ATTEND CHURCH OR RELIGIOUS SERVICES?: PATIENT DECLINED
HOW OFTEN DO YOU ATTEND CHURCH OR RELIGIOUS SERVICES?: PATIENT DECLINED
HOW OFTEN DO YOU GET TOGETHER WITH FRIENDS OR RELATIVES?: THREE TIMES A WEEK
HOW OFTEN DO YOU ATTENT MEETINGS OF THE CLUB OR ORGANIZATION YOU BELONG TO?: PATIENT DECLINED
IN A TYPICAL WEEK, HOW MANY TIMES DO YOU TALK ON THE PHONE WITH FAMILY, FRIENDS, OR NEIGHBORS?: MORE THAN THREE TIMES A WEEK
IN A TYPICAL WEEK, HOW MANY TIMES DO YOU TALK ON THE PHONE WITH FAMILY, FRIENDS, OR NEIGHBORS?: MORE THAN THREE TIMES A WEEK
DO YOU BELONG TO ANY CLUBS OR ORGANIZATIONS SUCH AS CHURCH GROUPS UNIONS, FRATERNAL OR ATHLETIC GROUPS, OR SCHOOL GROUPS?: PATIENT DECLINED
HOW OFTEN DO YOU GET TOGETHER WITH FRIENDS OR RELATIVES?: TWICE A WEEK
HOW OFTEN DO YOU ATTENT MEETINGS OF THE CLUB OR ORGANIZATION YOU BELONG TO?: PATIENT DECLINED

## 2023-10-19 ASSESSMENT — LIFESTYLE VARIABLES
HOW MANY STANDARD DRINKS CONTAINING ALCOHOL DO YOU HAVE ON A TYPICAL DAY: PATIENT DOES NOT DRINK
AUDIT-C TOTAL SCORE: 0
SKIP TO QUESTIONS 9-10: 1
HOW OFTEN DO YOU HAVE SIX OR MORE DRINKS ON ONE OCCASION: NEVER
HOW OFTEN DO YOU HAVE A DRINK CONTAINING ALCOHOL: NEVER

## 2023-10-19 ASSESSMENT — ACTIVITIES OF DAILY LIVING (ADL): DEPENDENT_IADLS:: INDEPENDENT

## 2023-10-19 NOTE — LETTER
[facility] CARE COORDINATION  [address] [facility] [address]   October 19, 2023    Félix Hogan  [address] [facility] [address]      Dear Kendell,      I am a  clinic care coordinator who works with CARLOTA Sen CNP with the [facility]. I wanted to thank you for spending the time to talk with me.  Below is a description of clinic care coordination and how I can further assist you.       The clinic care coordination team is made up of a registered nurse, , financial resource worker and community health worker who understand the health care system. The goal of clinic care coordination is to help you manage your health and improve access to the health care system. Our team works alongside your provider to assist you in determining your health and social needs. We can help you obtain health care and community resources, providing you with necessary information and education. We can work with you through any barriers and develop a care plan that helps coordinate and strengthen the communication between you and your care team.  Our services are voluntary and are offered without charge to you personally.    Please feel free to contact me with any questions or concerns regarding care coordination and what we can offer.      Resources discussed today:    Senior Linkage [place]   [place]://[place]senior[place]linkage[place]line/  9-061-020-1234    Fare for All bulk food program  https://www.[place]foodgroup[place].org/groceries/fare-for-all/    Financial Resource Worker (FRW)  Will call you to assist with [employer] application for [facility] balance     We are focused on providing you with the highest-quality healthcare experience possible.    Sincerely,     Vivian Taylor, HOWIE, MSW Clinic   [facility]  Care Coordination  [facility]   [email or web address]  Office: [phone]  Employed by  [employer]      Enclosed: I have enclosed a copy of a 24 Hour Access Plan. This has helpful phone numbers for you to call when needed. Please keep this in an easy to access place to use as needed.

## 2023-10-19 NOTE — LETTER
[facility]  Patient Centered Plan of Care  About Me:        Patient Name:  Kendell Caban    YOB: 1960  Age:         63 year old   [place] MRN:    [id number] Telephone Information:  Home Phone [phone]   Mobile [phone]       Address:  [address] [facility] [address] Email address:  [email or web address]      Emergency Contact(s)    Name Relationship Lgl Grd Work Phone Home Phone Mobile Phone   1. PENNIE THOMPSON Significant ot*    [phone]   2. FABIÁN BRIONES Daughter    [phone]           Primary language:  English     needed? No   [employer]:  [phone] op. 1  Other communication barriers:None    Preferred Method of Communication:     Current living arrangement: I live in a private home (alone)    Mobility Status/ Medical Equipment: Independent        Health Maintenance  Health Maintenance Reviewed: Due/Overdue   Health Maintenance Due   Topic Date Due    YEARLY PREVENTIVE VISIT  Never done    ADVANCE CARE PLANNING  Never done    COVID-19 Vaccine (1) Never done    Pneumococcal Vaccine: Pediatrics (0 to 5 Years) and At-Risk Patients (6 to 64 Years) (1 - PCV) Never done    COLORECTAL CANCER SCREENING  Never done    HIV SCREENING  Never done    HEPATITIS C SCREENING  Never done    ZOSTER IMMUNIZATION (1 of 2) Never done    RSV VACCINE 60+ (1 - 1-dose 60+ series) Never done    INFLUENZA VACCINE (1) Never done          My Access Plan  Medical Emergency 911   Primary Clinic Line [facility] - [phone]   24 Hour Appointment Line [phone] or  [phone]) (toll-free)   24 Hour Nurse Line [phone] (toll-free)   Preferred Urgent Care [facility] [phone]     Preferred Hospital [facility]  [phone]     Preferred Pharmacy [id number] - [place], [place] - [address]     Behavioral Health Crisis Line The National Suicide  Prevention Lifeline at 1-144.723.9354 or Text/Call 988             My Care Team Members  Patient Care Team         Relationship Specialty Notifications Start End    Nathan Lockwood APRN CNP PCP - General Family Medicine  10/19/23     Phone: [phone] Fax: [phone]         [address] [place] [address]    Nathan Lockwood APRN CNP Nurse Practitioner Family Medicine  10/10/23     Phone: [phone] Fax: [phone]         [address] [place] [address]    Froilan Ring MD MD Cardiovascular Disease  10/10/23     Phone: [phone] Fax: [phone] [address] [facility] [address]    Vivian Taylor BSW Lead Care Coordinator Primary Care - [facility] Admissions 10/16/23     Phone: [phone] Fax: [phone]        Jennifer Baird Community Health Worker Primary Care - [facility] Admissions 10/19/23     Phone: [phone] Fax: [phone]                  My Care Plans  Self Management and Treatment Plan  Care Plan  Care Plan: Health Maintenance       Problem: Health Maintenance Due or Overdue       Goal: Become up-to-date with health maintenance visit(s)       Start Date: 10/19/2023 Expected End Date: 3/22/2024    This Visit's Progress: 0%    Priority: Low    Note:     Barriers: overdue for an yearly preventative exam  Strengths: patient will consider scheduling this exam   Patient expressed understanding of goal: yes  Action steps to achieve this goal:  1. I will find time to call and schedule yearly exam   2. I will discuss my need for yearly exam                               Care Plan: General       Problem: Need for alternate insurance, for prescription coverage with insurance       Goal: I will work with FRW on insurance options, call Senior [facility] Line and discuss possible alternatives       Start Date: 10/19/2023 Expected End Date: 3/22/2024    This Visit's Progress: 0%    Priority: High    Note:     Barriers: need for alternate insurance, has no current  prescription coverage   Strengths: willing to work with FRW on resources, to contact Senior Linkage Line to discuss options   Patient expressed understanding of goal: yes  Action steps to achieve this goal:  1. I will find time to work with Financial Resource worker  on insurance needs   2. I will find time to contact Senior Linkage Line to discuss alternate options   3. I will reach out to  for further resources                                Action Plans on File: none                      Advance Care Plans/Directives Type: none      My Medical and Care Information  Problem List   Patient Active Problem List   Diagnosis    Genital herpes    Renal colic    New onset atrial fibrillation (H)    Gall stone pancreatitis          Care Coordination Start Date: 10/12/2023   Frequency of Care Coordination: monthly     Form Last Updated: 10/19/2023

## 2023-10-19 NOTE — PROGRESS NOTES
Clinic Care Coordination Contact  Clinic Care Coordination Contact  OUTREACH    Referral Information: SW initial phone assessment, part way through he asked if significant other could be added to call, which was done    Referral Source: PCP    Primary Diagnosis: Psychosocial    Chief Complaint   Patient presents with    Clinic Care Coordination - Initial     SW        Universal Utilization: Patient reports his provider is Nathan Lockwood, KAYLEE updated his chart   Clinic Utilization  Difficulty keeping appointments:: No  Compliance Concerns: No  No-Show Concerns: No  No PCP office visit in Past Year: No  Utilization      No Show Count (past year)  0             ED Visits  2             Hospital Admissions  1                    Current as of: 10/17/2023  1:07 AM              Clinical Concerns: Patient reports he has insurance, he believes it may [place].  He states he does not have prescription coverage.  He reports having new having new onset of Atrial Fibrillation,  He was out of work at that time for 2 weeks, then most of the next month in recovery.  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   He follows with cardiology.  He reports his provider has prescribed Xarelto, he cannot and does not have current prescription coverage, which he recently discovered while attempting to fill this prescription.  Patient reports he can afford his other medications.  Patient is interested in assistance with finding alternate insurance and with outstanding [place] bills.  KAYLEE discussed he role of the Financial Resource Worker (FRW). KAYLEE informed that the FRW can assist with [employer] Care (possible financial assistance with outstanding bills) and current insurance clarification.  KAYLEE discussed process for [place] application, stating if he qualifies he may eligible for retractive payment of above balance. Patient is accepting of referral, referral made     Current Medical Concerns:    Patient Active Problem List   Diagnosis     Genital herpes    Renal colic    New onset atrial fibrillation (H)    Gall stone pancreatitis        Current Behavioral Concerns: none   Education Provided to patient:  Resources discussed: FRW, discussed Fare for All bulk food program, Senior Linkage Line to research [facility], [place]     Pain  Pain (GOAL):: No  Health Maintenance Reviewed: Due/Overdue   Health Maintenance Due   Topic Date Due    YEARLY PREVENTIVE VISIT  Never done    ADVANCE CARE PLANNING  Never done    COVID-19 Vaccine (1) Never done    Pneumococcal Vaccine: Pediatrics (0 to 5 Years) and At-Risk Patients (6 to 64 Years) (1 - PCV) Never done    COLORECTAL CANCER SCREENING  Never done    HIV SCREENING  Never done    HEPATITIS C SCREENING  Never done    ZOSTER IMMUNIZATION (1 of 2) Never done    RSV VACCINE 60+ (1 - 1-dose 60+ series) Never done    INFLUENZA VACCINE (1) Never done      Patient will consider above care gaps, goal only for yearly exam as more certain he may pursue this goal     Clinical Pathway: None    Medication Management:  Medication review status: Medications reviewed and no changes reported per patient.             Functional Status:  Dependent ADLs:: Independent  Dependent IADLs:: Independent  Bed or wheelchair confined:: No  Mobility Status: Independent  Fallen 2 or more times in the past year?: No  Any fall with injury in the past year?: No    Living Situation:  Current living arrangement:: I live in a private home (alone)  Type of residence:: Private home - stairs    Lifestyle & Psychosocial Needs:    Social Determinants of Health     Food Insecurity: Low Risk  (10/9/2023)    Food Insecurity     Within the past 12 months, did you worry that your food would run out before you got money to buy more?: No     Within the past 12 months, did the food you bought just not last and you didn t have money to get more?: No   Depression: Not at risk (10/9/2023)    PHQ-2     PHQ-2 Score: 0   Housing Stability: Low Risk   (10/9/2023)    Housing Stability     Do you have housing? : Yes     Are you worried about losing your housing?: No   Tobacco Use: Low Risk  (10/16/2023)    Patient History     Smoking Tobacco Use: Never     Smokeless Tobacco Use: Never     Passive Exposure: Not on file   Financial Resource Strain: Low Risk  (10/9/2023)    Financial Resource Strain     Within the past 12 months, have you or your family members you live with been unable to get utilities (heat, electricity) when it was really needed?: No   Alcohol Use: Not At Risk (10/19/2023)    AUDIT-C     Frequency of Alcohol Consumption: Never     Average Number of Drinks: Patient does not drink     Frequency of Binge Drinking: Never   Transportation Needs: Low Risk  (10/9/2023)    Transportation Needs     Within the past 12 months, has lack of transportation kept you from medical appointments, getting your medicines, non-medical meetings or appointments, work, or from getting things that you need?: No   Physical Activity: Sufficiently Active (10/19/2023)    Exercise Vital Sign     Days of Exercise per Week: 5 days     Minutes of Exercise per Session: 50 min   Interpersonal Safety: Low Risk  (10/19/2023)    Interpersonal Safety     Do you feel physically and emotionally safe where you currently live?: Yes     Within the past 12 months, have you been hit, slapped, kicked or otherwise physically hurt by someone?: No     Within the past 12 months, have you been humiliated or emotionally abused in other ways by your partner or ex-partner?: No   Stress: No Stress Concern Present (10/19/2023)    [place] [employer] Occupational [employer] - Occupational Stress Questionnaire     Feeling of Stress : Not at all   Social Connections: Unknown (10/19/2023)    Social Connection and Isolation Panel [NHANES]     Frequency of Communication with Friends and Family: More than three times a week     Frequency of Social Gatherings with Friends and Family: Twice a week     Attends  Baptism Services: Patient refused     Active Member of Clubs or Organizations: Patient refused     Attends Club or Organization Meetings: Patient refused     Marital Status:      Diet:: Regular  Inadequate nutrition (GOAL):: No  Tube Feeding: No  Inadequate activity/exercise (GOAL):: No  Significant changes in sleep pattern (GOAL): No  Transportation means:: Regular car     Baptism or spiritual beliefs that impact treatment:: No  Mental health DX:: No  Mental health management concern (GOAL):: No  Chemical Dependency Status: No Current Concerns  Chemical Dependency Management: Other (see comment) (N/A)  Informal Support system:: Significant other, Children        Resources and Interventions: Resources discussed: AMANDA, discussed Fare for All bulk food program, Senior Linkage Line to research alternate insurances, [place]   Current Resources: cardiology     Community Resources: Other (see comment) (cardiology)  Supplies Currently Used at Home: None  Equipment Currently Used at Home: none  Employment Status: other (see comments) (self employed)       Advance Care Plan/Directive  Advanced Care Plans/Directives on file:: No  Advanced Care Plan/Directive Status: Declined Further Information    Referrals Placed: Other  (FRW, discussed Fare for All bulk food program, Senior Linkage Line to research alternate insurances; [facility] [place] and [place])    Care Plan:  Care Plan: Health Maintenance       Problem: Health Maintenance Due or Overdue       Goal: Become up-to-date with health maintenance visit(s)       Start Date: 10/19/2023 Expected End Date: 3/22/2024    This Visit's Progress: 0%    Priority: Low    Note:     Barriers: overdue for an yearly preventative exam  Strengths: patient will consider scheduling this exam   Patient expressed understanding of goal: yes  Action steps to achieve this goal:  1. I will find time to call and schedule yearly exam   2. I will discuss my need for yearly exam                                Care Plan: General       Problem: Need for alternate insurance, for prescription coverage with insurance       Goal: I will work with FRW on insurance options, call Senior Lnkage Line and discuss possible alternatives       Start Date: 10/19/2023 Expected End Date: 3/22/2024    This Visit's Progress: 0%    Priority: High    Note:     Barriers: need for alternate insurance, has no current prescription coverage   Strengths: willing to work with FRW on resources, to contact Senior Linkage Line to discuss options   Patient expressed understanding of goal: yes  Action steps to achieve this goal:  1. I will find time to work with Financial Resource worker  on insurance needs   2. I will find time to contact Senior Linkage Line to discuss alternate options   3. I will reach out to  for further resources                               Patient/Caregiver understanding: Patient will work with FRW on insurance  needs and Kira Care, possible application for [place].  Patient will research Senior Linkage Line for alternate insurance needs. He will consider scheduling a yearly exam     Outreach Frequency: monthly      Plan:   1) Patient will work with FRW on insurance  needs and Kira Care, possible application for [place]  2) Patient will research Senior Linkage Line for alternate insurance needs  3) Patient will consider scheduling a yearly exam   4)  will send introduction letter and Complex  care plan  5) CHW will call [patient  in 1 month, Care Coordinator will review progress to goals and plan of care in 6 weeks.     Vivian Taylor, BARBW, MSW   [facility]  Care Coordination  [facility]  [phone]  10/19/2023 6:01 PM        Financial Resource Worker Screening    [facility] Benefits  Is patient requesting help applying for [facility] benefits?: No  Have you recently applied for any [facility] benefits?: No  How many people in your household?: 1  Do you buy/eat food together?:  Yes    [place]:  [place]?: No  Is this an insurance renewal?: No  Is this a new insurance application request?: No    Any other information for the FRW?: Patient has outstanding [place] balance from recent hospital stays    Care Coordination team will tell patient:   Thank you for answering all the questions, based on screening questions, our Financial Resource Worker will reach out to you with additional questions and next steps.

## 2023-10-20 ENCOUNTER — PATIENT OUTREACH (OUTPATIENT)
Dept: CARE COORDINATION | Facility: CLINIC | Age: 63
End: 2023-10-20
Payer: COMMERCIAL

## 2023-10-20 NOTE — PROGRESS NOTES
Clinic Care Coordination Contact  Program: MA/CC   County:  [facility] Case #:  [facility] Worker:   Marleen #:   Subscriber #:   Renewal:  Date Applied:     FRW Outreach:   10/20/23: FRW called pt. Pt isn't working right now. Appointment set for 10/25 to apply for MA/CC.  Herminia Arcos   Financial Resource Worker  [facility]  Clinic Care Coordination  [phone]     Health Insurance:      Referral/Screening:   Patient has outstanding [place] balance from recent hospital stays,[place] application and clarification of current insurance

## 2023-10-20 NOTE — PROGRESS NOTES
Clinic Care Coordination Contact  Program: MA/CC   County:  [facility] Case #:  [facility] Worker:   Marleen #:   Subscriber #:   Renewal:  Date Applied:     AMANDA Outreach:   10/23/23:    Health Insurance:      Referral/Screening:   Patient has outstanding [place] balance from recent hospital stays,[place] application and clarification of current insurance

## 2023-10-25 ENCOUNTER — PATIENT OUTREACH (OUTPATIENT)
Dept: CARE COORDINATION | Facility: CLINIC | Age: 63
End: 2023-10-25

## 2023-10-25 NOTE — PROGRESS NOTES
Clinic Care Coordination Contact  Program: MA/CC   County:  [facility] Case #:  [facility] Worker:   Marleen #:   Subscriber #:   Renewal:  Date Applied:     FRW Outreach:   10/25/23: FRW called pt at scheduled appointment time. Patient asked FRW to call back tomorrow at 3 pm. New appointment scheduled at 3 pm.   Herminia Arcos   Financial Resource Worker  [facility] Care Coordination  [phone]   10/20/23: FRW called pt. Pt isn't working right now. Appointment set for 10/25 to apply for [place]/CC.  Herminia Arcos   Financial Resource Worker  [facility] Care Coordination  [phone]     Health Insurance:      Referral/Screening:   Patient has outstanding [place] balance from recent hospital stays,[place] application and clarification of current insurance

## 2023-10-26 ENCOUNTER — APPOINTMENT (OUTPATIENT)
Dept: CARE COORDINATION | Facility: CLINIC | Age: 63
End: 2023-10-26
Payer: COMMERCIAL

## 2023-10-26 ENCOUNTER — PATIENT OUTREACH (OUTPATIENT)
Dept: CARE COORDINATION | Facility: CLINIC | Age: 63
End: 2023-10-26

## 2023-10-26 NOTE — PROGRESS NOTES
Clinic Care Coordination Contact  Program: MA/CC   County:  [facility] Case #:  [facility] Worker:   Marleen #:   Subscriber #:   Renewal:  Date Applied:     FRW Outreach:   10/26/23: FRW called pt and spoke with him and his friend, Meron. Patient explained he has a qualified health plan through [employer]. Since patient's income has changed he will need to update income with [employer] to be switched to state insurance. Patient will call Nyaure and we will connect next week.   Herminia Arcos   Financial Resource Worker  [facility] Care Coordination  [phone]   10/25/23: FRW called pt at scheduled appointment time. Patient asked FRW to call back tomorrow at 3 pm. New appointment scheduled at 3 pm.   Herminia Arcos   Financial Resource Worker  [facility] Care Coordination  [phone]   10/20/23: FRW called pt. Pt isn't working right now. Appointment set for 10/25 to apply for MA/CC.  Herminia Arcos   Financial Resource Worker  [facility] Care Coordination  [phone]     Health Insurance:      Referral/Screening:   Patient has outstanding [place] balance from recent hospital stays,[place] application and clarification of current insurance

## 2023-11-03 ENCOUNTER — PATIENT OUTREACH (OUTPATIENT)
Dept: CARE COORDINATION | Facility: CLINIC | Age: 63
End: 2023-11-03
Payer: COMMERCIAL

## 2023-11-03 NOTE — PROGRESS NOTES
Clinic Care Coordination Contact  Program: MA/CC   County:  [facility] Case #:  [facility] Worker:   Marleen #:   Subscriber #:   Renewal:  Date Applied:     FRW Outreach:   11/3/23: FRW called pt and left VM. FRW will make another outreach to pt within 30 days.   Herminia Arcos   Financial Resource Worker  [facility] Care Coordination  [phone]   10/26/23: FRW called pt and spoke with him and his friend, Meron. Patient explained he has a qualified health plan through [employer]. Since patient's income has changed he will need to update income with [employer] to be switched to state insurance. Patient will call Nyaure and we will connect next week.   Herminia Arcos   Financial Resource Worker  [facility] Care Coordination  [phone]   10/25/23: FRW called pt at scheduled appointment time. Patient asked FRW to call back tomorrow at 3 pm. New appointment scheduled at 3 pm.   Herminia Arcos   Financial Resource Worker  [facility] Care Coordination  [phone]   10/20/23: FRW called pt. Pt isn't working right now. Appointment set for 10/25 to apply for MA/CC.  Herminia Arcos   Financial Resource Worker  [facility] Care Coordination  [phone]     Health Insurance:      Referral/Screening:   Patient has outstanding [place] balance from recent hospital stays,[place] application and clarification of current insurance

## 2023-11-04 ENCOUNTER — HEALTH MAINTENANCE LETTER (OUTPATIENT)
Age: 63
End: 2023-11-04

## 2023-11-10 ENCOUNTER — TELEPHONE (OUTPATIENT)
Dept: CARDIOLOGY | Facility: CLINIC | Age: 63
End: 2023-11-10
Payer: COMMERCIAL

## 2023-11-10 DIAGNOSIS — I48.0 PAROXYSMAL ATRIAL FIBRILLATION (H): Primary | ICD-10-CM

## 2023-11-10 NOTE — TELEPHONE ENCOUNTER
[facility] Call Center    Phone Message    May a detailed message be left on voicemail: yes     Reason for Call: Other: Pt would like a call back to discuss Xarelto asap as he stated he was told he might stop taking it      Action Taken: Other: Cardio    Travel Screening: Not Applicable

## 2023-11-10 NOTE — TELEPHONE ENCOUNTER
New prescription sent in for patient to use 30 day free copay card.    Going this route because patient has 5 days of Xarelto remaining and a 30 day supply costs 550.00.    Per Dr. Ring clinic note of 10/16 his CHADS2-Vas is 0 and he stated he may not need it clinically but will keep him on for a short time.    Patient will  card at [facility] and I will send Dr. Ring a phone message asking for his recommendation concerning the need for OAC    Patient monitors his HR via watch and has not noticed any high HR's later-he is asymptomatic with afib.

## 2023-11-10 NOTE — TELEPHONE ENCOUNTER
I called patient and he states that he has five days of Xarelto remaining and a 30 days supply would be 550.00 which he cannot afford.    When Dr. Ring first saw patient on 10/16 for PAF he mentioned that his CHADS2-Vasc is 0 and may not need OAC for a long duration.    In September a Ziopatch showed a 57% burden of afib. However, patient states that he has not had any high HR's via his watch of late- he is asymptomatic with afib.    I told patient that when Dr. Ring returns around 11/21 we will get his recommendations concerning the length of time he wants him to remain on Xarelto.     In the meantime, we will give him a 30 day free Xarelto co pay card to buy us time until we hear from Dr. Ring

## 2023-11-20 ENCOUNTER — PATIENT OUTREACH (OUTPATIENT)
Dept: CARE COORDINATION | Facility: CLINIC | Age: 63
End: 2023-11-20
Payer: COMMERCIAL

## 2023-11-20 NOTE — PROGRESS NOTES
Clinic Care Coordination Contact  Community Health Worker Follow Up    Care Gaps:     Health Maintenance Due   Topic Date Due    YEARLY PREVENTIVE VISIT  Never done    ADVANCE CARE PLANNING  Never done    COVID-19 Vaccine (1) Never done    Pneumococcal Vaccine: Pediatrics (0 to 5 Years) and At-Risk Patients (6 to 64 Years) (1 - PCV) Never done    COLORECTAL CANCER SCREENING  Never done    HIV SCREENING  Never done    HEPATITIS C SCREENING  Never done    ZOSTER IMMUNIZATION (1 of 2) Never done    RSV VACCINE (Pregnancy & 60+) (1 - 1-dose 60+ series) Never done    INFLUENZA VACCINE (1) Never done       Patient will schedule when he has active insurance    Care Plan:   Care Plan: Health Maintenance       Problem: Health Maintenance Due or Overdue       Goal: Become up-to-date with health maintenance visit(s)       Start Date: 10/19/2023 Expected End Date: 3/22/2024    This Visit's Progress: 20% Recent Progress: 0%    Priority: Low    Note:     Barriers: overdue for an yearly preventative exam  Strengths: patient will consider scheduling this exam   Patient expressed understanding of goal: yes  Action steps to achieve this goal:  1. I will find time to call and schedule yearly exam   2. I will discuss my need for yearly exam                               Care Plan: General       Problem: Need for alternate insurance, for prescription coverage with insurance       Goal: I will work with FRW on insurance options, call [facility] and discuss possible alternatives       Start Date: 10/19/2023 Expected End Date: 3/22/2024    This Visit's Progress: 20% Recent Progress: 0%    Priority: High    Note:     Barriers: need for alternate insurance, has no current prescription coverage   Strengths: willing to work with FRW on resources, to contact Senior Linkage Line to discuss options   Patient expressed understanding of goal: yes  Action steps to achieve this goal:  1. I will find time to work with Financial Resource worker   on insurance needs   2. I will find time to contact Senior Linkage Line to discuss alternate options   3. I will reach out to  for further resources                               Intervention and Education during outreach: Patient has been working with FRW but has forgotten to call her back. He did get back into his [employer] account and submitted his income statements. He was then asked if he wanted to keep his current insurance with financial assistance, this is where he is stuck. CHW did recommend that he connect with financial resource worker for more guidance on this. He has the contact information.     CHW Plan: CHW will continue to support patient with goals through routine scheduled outreach.     Next outreach due:  12/20/23

## 2023-11-21 NOTE — TELEPHONE ENCOUNTER
Jhonathan, Froilan Moe MD  You2 days ago     BI  Can we repeat the Holter soon to reassess afib burden, if less I think can stop DOAC.  Thanks.         I called patient to discuss with him the fact that Dr. Ring would like for him to wear 48 hour holter to see how much afib he is having to help him answer the question of whether he need to remain on his DOAC.    Patient is agreeable to having the monitor and I will place the order and have scheduling call him today to make this appt.

## 2023-11-27 ENCOUNTER — PATIENT OUTREACH (OUTPATIENT)
Dept: CARE COORDINATION | Facility: CLINIC | Age: 63
End: 2023-11-27
Payer: COMMERCIAL

## 2023-11-27 NOTE — PROGRESS NOTES
Clinic Care Coordination Contact  Care Coordination Clinician Chart Review    Situation: Patient chart reviewed by Care Coordinator.       Background: Care Coordination Program started: 10/12/2023. Initial assessment completed and patient-centered care plan(s) were developed with participation from patient. Lead CC handed patient off to CHW for continued outreaches.       Assessment: Per chart review, patient outreach completed by CC CHW on 11/20/2023.  Per CHW note, patient will address care gaps once insurance in place.  Patient was working with FRW on insurance, they lost contact and FRW closed to CC.  Per CHW note, patient forgot to call FRW back to discuss next steps.  Patient reported having submitted income documents, he possibly qualify for state assistance due to change in income.  He has questions regarding this process, CHW encouraged he call the FRW.  Patient states he has FRW's contact information and will do so.  Patient is actively working to accomplish goal(s). Patient's goal(s) appropriate and relevant at this time. Patient is not due for updated Plan of Care.  Assessments will be completed annually or as needed/with change of patient status.      Care Plan: Health Maintenance       Problem: Health Maintenance Due or Overdue       Goal: Become up-to-date with health maintenance visit(s)       Start Date: 10/19/2023 Expected End Date: 3/22/2024    Recent Progress: 20%    Priority: Low    Note:     Barriers: overdue for an yearly preventative exam, needs insurance   Strengths: patient will consider scheduling this exam   Patient expressed understanding of goal: yes  Action steps to achieve this goal:  1. I will find time to call and schedule yearly exam   2. I will discuss my need for yearly exam                                    Plan/Recommendations: The patient will continue working with Care Coordination to achieve goal(s) as above. CHW will continue outreaches at minimum every 30 days and will  involve Lead CC as needed or if patient is ready to move to Maintenance. Lead CC will continue to monitor CHW outreaches and patient's progress to goal(s) every 6 weeks.     Plan of Care updated and sent to patient: No    HOWIE Paredes, MSW   [facility]  Care Coordination  [facility]  [phone]  11/27/2023 11:31 AM

## 2023-11-27 NOTE — PROGRESS NOTES
Clinic Care Coordination Contact  Program: MA/CC   County:  [facility] Case #:  County Worker:   Marleen #:   Subscriber #:   Renewal:  Date Applied:     FRW Outreach:   11/27/23: FRW called pt and left VM. Please send new referral if there are FRW needs in future.   Herminia Arcos   Financial Resource Worker  [facility] Care Coordination  [phone]   11/3/23: FRW called pt and left VM. FRW will make another outreach to pt within 30 days.   Herminia Arcos   Financial Resource Worker  [facility] Care Coordination  [phone]   10/26/23: FRW called pt and spoke with him and his friend, Meron. Patient explained he has a qualified health plan through [employer]. Since patient's income has changed he will need to update income with [employer] to be switched to state insurance. Patient will call [employer] and we will connect next week.   Herminia Arcos   Financial Resource Worker  [facility] Care Coordination  [phone]   10/25/23: FRW called pt at scheduled appointment time. Patient asked FRW to call back tomorrow at 3 pm. New appointment scheduled at 3 pm.   Herminia Arcos   Financial Resource Worker  [facility] Care Coordination  [phone]   10/20/23: FRW called pt. Pt isn't working right now. Appointment set for 10/25 to apply for MA/CC.  Herminia Arcos   Financial Resource Worker  [facility] Care Coordination  [phone]     Health Insurance:      Referral/Screening:   Patient has outstanding [place] balance from recent hospital stays,MA application and clarification of current insurance

## 2023-11-28 ENCOUNTER — HOSPITAL ENCOUNTER (OUTPATIENT)
Dept: CARDIOLOGY | Facility: CLINIC | Age: 63
Discharge: HOME | End: 2023-11-28
Attending: INTERNAL MEDICINE | Admitting: INTERNAL MEDICINE
Payer: COMMERCIAL

## 2023-11-28 DIAGNOSIS — I48.0 PAROXYSMAL ATRIAL FIBRILLATION (H): ICD-10-CM

## 2023-11-28 PROCEDURE — 93227 XTRNL ECG REC<48 HR R&I: CPT | Performed by: INTERNAL MEDICINE

## 2023-11-28 PROCEDURE — 93225 XTRNL ECG REC<48 HRS REC: CPT

## 2023-12-27 ENCOUNTER — PATIENT OUTREACH (OUTPATIENT)
Dept: CARE COORDINATION | Facility: CLINIC | Age: 63
End: 2023-12-27
Payer: COMMERCIAL

## 2023-12-27 NOTE — PROGRESS NOTES
Clinic Care Coordination Contact  Community Health Worker Follow Up    Care Gaps:     Health Maintenance Due   Topic Date Due    YEARLY PREVENTIVE VISIT  Never done    ADVANCE CARE PLANNING  Never done    COVID-19 Vaccine (1) Never done    Pneumococcal Vaccine: Pediatrics (0 to 5 Years) and At-Risk Patients (6 to 64 Years) (1 of 2 - PCV) Never done    COLORECTAL CANCER SCREENING  Never done    HIV SCREENING  Never done    HEPATITIS C SCREENING  Never done    ZOSTER IMMUNIZATION (1 of 2) Never done    RSV VACCINE (Pregnancy & 60+) (1 - 1-dose 60+ series) Never done    INFLUENZA VACCINE (1) Never done       Patient will schedule appointment when his new insurance is active.    Care Plan:   Care Plan: Health Maintenance       Problem: Health Maintenance Due or Overdue       Goal: Become up-to-date with health maintenance visit(s)       Start Date: 10/19/2023 Expected End Date: 3/22/2024    This Visit's Progress: 30% Recent Progress: 20%    Priority: Low    Note:     Barriers: overdue for an yearly preventative exam, needs insurance   Strengths: patient will consider scheduling this exam   Patient expressed understanding of goal: yes  Action steps to achieve this goal:  1. I will find time to call and schedule yearly exam   2. I will discuss my need for yearly exam                                 Intervention and Education during outreach: CHW had a lengthy discuss with patient regarding some billing issues that he is having. He states that it always shows that he doesn't have active insurance although his card is scanned at each visit. He is unsure what claims have been sent to insurance and what is his portion to pay. CHW did ask patient to follow up with the billing department to discuss this as CHW does not have access to billing information or claims. He states he will do this in the next few days. He hasn't been working so he is unsure how he will pay his portion. CHW will send [facility] information to patient  to apply to see if he qualifies.     CHW Plan: CHW will continue to support patient with goals through routine scheduled outreach.     Next outreach due: 1/24/24

## 2023-12-27 NOTE — LETTER
[facility]  [address] [address]    December 27, 2023    Kendell Caban  [address] [facility] [address]      Dear Kendell,    I am the clinic community health worker who works with CARLOTA Sen CNP with the [facility]. I wanted to thank you for spending the time to talk with me.      Here is the information to apply for [facility] Care:  [email or web address]     GABE Segundo [place], Lonnie Tovar [place] and [facility]  [phone]

## 2024-01-05 ENCOUNTER — PATIENT OUTREACH (OUTPATIENT)
Dept: CARE COORDINATION | Facility: CLINIC | Age: 64
End: 2024-01-05
Payer: COMMERCIAL

## 2024-01-05 DIAGNOSIS — I48.0 PAROXYSMAL ATRIAL FIBRILLATION (H): ICD-10-CM

## 2024-01-05 RX ORDER — METOPROLOL TARTRATE 25 MG/1
25 TABLET, FILM COATED ORAL 2 TIMES DAILY
Qty: 180 TABLET | Refills: 0 | Status: SHIPPED | OUTPATIENT
Start: 2024-01-05 | End: 2024-04-04

## 2024-01-05 NOTE — PROGRESS NOTES
Clinic Care Coordination Contact  Care Coordination Clinician Chart Review    Situation: Patient chart reviewed by Care Coordinator.       Background: Care Coordination Program started: 10/12/2023. Initial assessment completed and patient-centered care plan(s) were developed with participation from patient. Lead CC handed patient off to CHW for continued outreaches.       Assessment: Per chart review, patient outreach completed by CC CHW on 12/27/2023.  Per CHW note, patient will address care gaps once has active insurance.   CHW sent [facility] information to patient for potential assistance with outstanding [place] balance.   Patient is not actively working to accomplish goal(s). Patient's goal(s) appropriate and relevant at this time. Patient is due for updated Plan of Care.  Assessments will be completed annually or as needed/with change of patient status.      Care Plan: Health Maintenance       Problem: Health Maintenance Due or Overdue       Goal: Become up-to-date with health maintenance visit(s)       Start Date: 10/19/2023 Expected End Date: 5/23/2024    Recent Progress: 30%    Priority: Low    Note:     Barriers: overdue for an yearly preventative exam, needs insurance   Strengths: patient will consider scheduling this exam   Patient expressed understanding of goal: yes  Action steps to achieve this goal:  1. I will find time to call and schedule yearly exam   2. I will discuss my need for yearly exam                                    Plan/Recommendations: The patient will continue working with Care Coordination to achieve goal(s) as above. CHW will continue outreaches at minimum every 30 days and will involve Lead CC as needed or if patient is ready to move to Maintenance. Lead CC will continue to monitor CHW outreaches and patient's progress to goal(s) every 6 weeks.     Plan of Care updated and sent to patient: Yes, via BARB SolerW, MSW   [facility]  [facility] Demario Martinez and Lonnie [facility]  [phone]  1/5/2024 8:24 AM

## 2024-01-05 NOTE — LETTER
[facility]  [address] [facility] [address] [facility] [address]   January 5, 2024        Kendell Caban  [address] [place] [id number]          Dear Walter Rdz is an updated Patient Centered Plan of Care for your continued enrollment in Care Coordination. Please let us know if you have additional questions, concerns, or goals that we can assist with.    Sincerely,    Vivian Taylor, BARBW, MSW Clinic   [facility]  Care [facility]   [email or web address]  Office: [phone]  Employed by [facility] [facility]  Patient Centered Plan of Care  About Me:        Patient Name:  Kendell Caban    YOB: 1960  Age:         63 year old   [place] MRN:    [id number] Telephone Information:  Home Phone [phone]   Mobile [phone]       Address:  [address] [place] [id number] Email address:  [email or web address]      Emergency Contact(s)    Name Relationship Lgl Grd Work Phone Home Phone Mobile Phone   1. PENNIE THOMPSON Significant ot*    [phone]   2. FABIÁN BRIONES Daughter    [phone]           Primary language:  English     needed? No   [facility] [employer] [facility]:  [phone] op. 1  Other communication barriers:None    Preferred Method of Communication:  Maxwell    Current living arrangement: I live alone; I live in a private home    Mobility Status/ Medical Equipment: Independent        Health Maintenance  Health Maintenance Reviewed: Due/Overdue   Health Maintenance Due   Topic Date Due    YEARLY PREVENTIVE VISIT  Never done    ADVANCE CARE PLANNING  Never done    COVID-19 Vaccine (1) Never done    Pneumococcal Vaccine: Pediatrics (0 to 5 Years) and At-Risk Patients (6 to 64 Years) (1 of 2 - PCV) Never done    COLORECTAL CANCER SCREENING  Never done    HIV SCREENING  Never done    HEPATITIS C SCREENING  Never done     ZOSTER IMMUNIZATION (1 of 2) Never done    RSV VACCINE (Pregnancy & 60+) (1 - 1-dose 60+ series) Never done    INFLUENZA VACCINE (1) Never done    PHQ-2 (once per calendar year)  01/01/2024          My Access Plan  Medical Emergency 911   Primary Clinic Line [facility] - [phone]   24 Hour Appointment Line [phone] or  [phone]) (toll-free)   24 Hour Nurse Line [phone] (toll-free)   Preferred Urgent Care Other     Preferred Hospital [facility]  [phone]     Preferred Pharmacy [id number] [address]     Behavioral Health Crisis Line The National Suicide Prevention Lifeline at 1-842.478.9500 or Text/Call 098           My Care Team Members  Patient Care Team         Relationship Specialty Notifications Start End    Nathan Lockwood APRN CNP PCP - General Family Medicine  10/19/23     Phone: [phone] Fax: [phone]         [address] [place] [address]    Nathan Lockwood APRN CNP Assigned PCP   9/21/23     Phone: [phone] Fax: [phone]         [address] [place] [address]    Nathan Lockwood APRN CNP Nurse Practitioner Family Medicine  10/10/23     Phone: [phone] Fax: [phone]         [address] [place] [address]    Froilan Ring MD MD Cardiovascular Disease  10/10/23     Phone: [phone] Fax: [phone] [address] [place] [address]    Vivian Taylor BSW Lead Care Coordinator Primary Care - CC Admissions 10/16/23     Phone: [phone] Fax: [phone]        Jennifer Baird CHW Community Health Worker Primary Care - CC Admissions 10/19/23     Phone: [phone] Fax: [phone]        Froilan Ring MD Assigned Heart and Vascular Provider   10/21/23     Phone: [phone] Fax: [phone] [address] [place] [address]                My Care Plans  Self Management and  Treatment Plan    Care Plan  Care Plan: Health Maintenance       Problem: Health Maintenance Due or Overdue       Goal: Become up-to-date with health maintenance visit(s)       Start Date: 10/19/2023 Expected End Date: 5/23/2024    Recent Progress: 30%    Priority: Low    Note:     Barriers: overdue for an yearly preventative exam, needs insurance   Strengths: patient will consider scheduling this exam   Patient expressed understanding of goal: yes  Action steps to achieve this goal:  1. I will find time to call and schedule yearly exam   2. I will discuss my need for yearly exam                                 Action Plans on File: none                      Advance Care Plans/Directives:   Advanced Care Plan/Directives on file:   No    Discussed with patient/caregiver(s): No data recorded           My Medical and Care Information  Problem List   Patient Active Problem List   Diagnosis    Genital herpes    Renal colic    New onset atrial fibrillation (H)    Gall stone pancreatitis          Care Coordination Start Date: 10/12/2023   Frequency of Care Coordination: monthly, more frequently as needed     Form Last Updated: 01/05/2024

## 2024-01-31 ENCOUNTER — PATIENT OUTREACH (OUTPATIENT)
Dept: CARE COORDINATION | Facility: CLINIC | Age: 64
End: 2024-01-31
Payer: COMMERCIAL

## 2024-01-31 NOTE — PROGRESS NOTES
[facility]/Voicemail    Clinical Data: Care Coordinator Outreach    Outreach Documentation Number of Outreach Attempt   1/31/2024   3:13 PM 1       Left message on patient's voicemail with call back information and requested return call.    Plan:   Care Coordinator will try to reach patient again in 3-5 business days.    Next outreach due: 2/6/24

## 2024-02-02 ENCOUNTER — PATIENT OUTREACH (OUTPATIENT)
Dept: CARE COORDINATION | Facility: CLINIC | Age: 64
End: 2024-02-02
Payer: COMMERCIAL

## 2024-02-02 NOTE — PROGRESS NOTES
Clinic Care Coordination Contact    Situation: Patient chart reviewed by care coordinator.    Background: Patient is enrolled     Assessment: CHW is attempting to reach patient for update     Plan/Recommendations: SW will review in 1-2 weeks      HOWIE Paredes, MSW   [facility]  Care Coordination  [facility]  [phone]  2/2/2024 3:07 PM

## 2024-02-13 ENCOUNTER — PATIENT OUTREACH (OUTPATIENT)
Dept: CARE COORDINATION | Facility: CLINIC | Age: 64
End: 2024-02-13
Payer: COMMERCIAL

## 2024-02-13 NOTE — PROGRESS NOTES
Clinic Care Coordination Contact    Situation: Patient chart reviewed by care coordinator.    Background: Patient is enrolled     Assessment: CHW attempting to reach patient.  CHW has made two unsuccessful calls and sent Unable to Contact  letter with plan to call end of the month     Plan/Recommendations: SW will review early next month     HOWIE Paredes, MSW   [facility]  Care Coordination  [facility]  [phone]  2/13/2024 2:39 PM

## 2024-02-13 NOTE — LETTER
[facility]  [address] [facility] [address]    February 13, 2024    Kendell Caban  [address] [facility] [address]      Dear Kendell,    I have been attempting to reach you since our last contact. I would like to continue to work with you and provide any additional support you may need on achieving your health care related goals. I would appreciate if you would give me a call at [phone] to let me know if you would like to continue working together. I know that there are many things that can affect our ability to communicate and I hope we can continue to work together.    All of us at the [facility] are invested in your health and are here to assist you in meeting your goals.     Sincerely,    GABE Mcgee

## 2024-02-13 NOTE — PROGRESS NOTES
[facility]/Voicemail    Clinical Data: Care Coordinator Outreach    Outreach Documentation Number of Outreach Attempt   1/31/2024   3:13 PM 1   2/13/2024  11:03 AM 2       Left message on patient's voicemail with call back information and requested return call.    Plan: Care Coordinator will send unable to contact letter with care coordinator contact information via [employer]. Care Coordinator will try to reach patient again in 10 business days.    Next outreach due: 2/27/24

## 2024-02-26 ENCOUNTER — OFFICE VISIT (OUTPATIENT)
Dept: CARDIOLOGY | Facility: CLINIC | Age: 64
End: 2024-02-26
Attending: INTERNAL MEDICINE
Payer: COMMERCIAL

## 2024-02-26 VITALS
SYSTOLIC BLOOD PRESSURE: 104 MMHG | DIASTOLIC BLOOD PRESSURE: 74 MMHG | HEART RATE: 72 BPM | WEIGHT: 206.2 LBS | HEIGHT: 71 IN | OXYGEN SATURATION: 96 % | BODY MASS INDEX: 28.87 KG/M2

## 2024-02-26 DIAGNOSIS — I48.0 PAROXYSMAL ATRIAL FIBRILLATION (H): ICD-10-CM

## 2024-02-26 PROCEDURE — 99213 OFFICE O/P EST LOW 20 MIN: CPT | Performed by: INTERNAL MEDICINE

## 2024-02-26 NOTE — PROGRESS NOTES
HPI and Plan:   Mr Caban returns today for follow-up of paroxysmal atrial fibrillation.  He is accompanied by his wife.    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    Despite high heart rates he had no symptoms.  He had initial rhythm monitoring which showed an over 50% atrial fibrillation burden.  As.he recovered from the above illness I repeated rhythm monitoring which now showed A-fib burden has decreased to a 20%    Exercise tolerance continues to be good.  He moves heavy objects in his work and is very physically active and he has no cardiac symptoms whatsoever.    Echocardiogram does show structurally normal heart with normal atrial sizes.  His aorta is borderline dilated measuring 4.0 cm    One-stage he was put on Xarelto by other providers but this was too cost prohibitive and more than 500 bucks a month.  He is currently on nothing and as he has a CHADS2 vascular score of 0 I do not think he needs to be on anything.    Cardiac examination is normal    This gentleman has lone atrial fibrillation.  I do not see any specific triggering factors for this condition.  I will keep him on no medications for now but going forward as he ages I think they will be a good chance that we may need some intervention, initially pharmacological    Provisionally have scheduled to see him again in 1 years time and he will have repeat rhythm monitoring prior to clinic visit.  Should he develop any symptoms I asked him to contact us.    Orders Placed This Encounter   Procedures    Follow-Up with Cardiology    Holter Monitor 24 hour Adult Pediatric       No orders of the defined types were placed in this encounter.      Encounter Diagnosis   Name Primary?    Paroxysmal atrial fibrillation (H)        CURRENT MEDICATIONS:  Current Outpatient Medications   Medication Sig  Dispense Refill    acetaminophen (TYLENOL) 325 MG tablet Take 2 tablets (650 mg) by mouth every 6 hours as needed for other (For optimal non-opioid multimodal pain management to improve pain control.) (Patient not taking: Reported on 10/9/2023)      apixaban ANTICOAGULANT (ELIQUIS) 5 MG tablet Take 1 tablet (5 mg) by mouth 2 times daily (Patient not taking: Reported on 10/16/2023) 180 tablet 0    metoprolol succinate ER (TOPROL XL) 25 MG 24 hr tablet Take 0.5 tablets (12.5 mg) by mouth daily (Patient not taking: Reported on 2/26/2024) 60 tablet 3    metoprolol tartrate (LOPRESSOR) 25 MG tablet TAKE 1 TABLET (25 MG) BY MOUTH 2 TIMES DAILY FOR 90 DAYS (Patient not taking: Reported on 2/26/2024) 180 tablet 0    pantoprazole (PROTONIX) 40 MG EC tablet Take 1 tablet (40 mg) by mouth daily (Patient not taking: Reported on 10/9/2023) 30 tablet 0    rivaroxaban ANTICOAGULANT (XARELTO) 20 MG TABS tablet Take 1 tablet (20 mg) by mouth daily (with dinner) (Patient not taking: Reported on 2/26/2024) 30 tablet 1       ALLERGIES     Allergies   Allergen Reactions    No Known Drug Allergy        PAST MEDICAL HISTORY:  Past Medical History:   Diagnosis Date    Cholelithiasis     Depression     Kidney stone     Paroxysmal A-fib (H) 09/07/2023    Unspecified viral hepatitis without mention of hepatic coma     as infant       PAST SURGICAL HISTORY:  Past Surgical History:   Procedure Laterality Date    COMBINED CYSTOSCOPY, RETROGRADES, URETEROSCOPY, LASER HOLMIUM LITHOTRIPSY URETER(S), INSERT STENT Left 3/25/2016    Procedure: COMBINED CYSTOSCOPY, RETROGRADES, URETEROSCOPY, LASER HOLMIUM LITHOTRIPSY URETER(S), INSERT STENT;  Surgeon: Gen Funes MD;  Location:  OR    ENDOSCOPIC RETROGRADE CHOLANGIOPANCREATOGRAM N/A 9/8/2023    Procedure: ENDOSCOPIC RETROGRADE CHOLANGIOPANCREATOGRAPHY;  Surgeon: Craig Pfeiffer MD;  Location:  OR    LAPAROSCOPIC CHOLECYSTECTOMY N/A 9/9/2023    Procedure: CHOLECYSTECTOMY,  LAPAROSCOPIC;  Surgeon: Marco Almaraz MD;  Location: [facility] OR    REMV PILONIDAL LESION SIMPLE         FAMILY HISTORY:  Family History   Problem Relation Age of Onset    Alzheimer Disease No family hx of     Cancer Paternal Aunt         colon    Cancer Paternal Aunt         coloc    Diabetes No family hx of     Heart Disease No family hx of     Cerebrovascular Disease Brother         age 40    Cancer Father     Cancer Paternal Grandfather        SOCIAL HISTORY:  Social History     Socioeconomic History    Marital status:      Spouse name: None    Number of children: None    Years of education: None    Highest education level: None   Tobacco Use    Smoking status: Never    Smokeless tobacco: Never   Substance and Sexual Activity    Alcohol use: No     Comment: Very rare    Drug use: No     Comment: drinks quite a bit of caffeine    Sexual activity: Yes     Partners: Female     Social Determinants of Health     Financial Resource Strain: Low Risk  (10/19/2023)    Financial Resource Strain     Within the past 12 months, have you or your family members you live with been unable to get utilities (heat, electricity) when it was really needed?: No   Food Insecurity: Low Risk  (10/19/2023)    Food Insecurity     Within the past 12 months, did you worry that your food would run out before you got money to buy more?: No     Within the past 12 months, did the food you bought just not last and you didn t have money to get more?: No   Transportation Needs: Low Risk  (10/19/2023)    Transportation Needs     Within the past 12 months, has lack of transportation kept you from medical appointments, getting your medicines, non-medical meetings or appointments, work, or from getting things that you need?: No   Physical Activity: Sufficiently Active (10/19/2023)    Exercise Vital Sign     Days of Exercise per Week: 7 days     Minutes of Exercise per Session: 150+ min   Stress: Stress Concern Present (10/19/2023)    [place]  "[place] of Occupational Health - Occupational Stress Questionnaire     Feeling of Stress : To some extent   Social Connections: Socially Isolated (10/19/2023)    Social Connection and Isolation Panel [NHANES]     Frequency of Communication with Friends and Family: More than three times a week     Frequency of Social Gatherings with Friends and Family: More than three times a week     Attends Church Services: Never     Active Member of Clubs or Organizations: No     Attends Club or Organization Meetings: Never     Marital Status:    Interpersonal Safety: Low Risk  (10/19/2023)    Interpersonal Safety     Do you feel physically and emotionally safe where you currently live?: Yes     Within the past 12 months, have you been hit, slapped, kicked or otherwise physically hurt by someone?: No     Within the past 12 months, have you been humiliated or emotionally abused in other ways by your partner or ex-partner?: No   Housing Stability: High Risk (10/19/2023)    Housing Stability     Do you have housing? : Yes     Are you worried about losing your housing?: Yes       Review of Systems:  Skin:  not assessed     Eyes:  not assessed    ENT:  not assessed    Respiratory:  Negative    Cardiovascular:  Negative    Gastroenterology: not assessed    Genitourinary:  not assessed    Musculoskeletal:  not assessed    Neurologic:  not assessed    Psychiatric:  not assessed    Heme/Lymph/Imm:  not assessed    Endocrine:  not assessed      Physical Exam:  Vitals: /74 (BP Location: Right arm, Patient Position: Sitting, Cuff Size: Adult Regular)   Pulse 72   Ht 1.803 m (5' 11\")   Wt 93.5 kg (206 lb 3.2 oz)   SpO2 96%   BMI 28.76 kg/m      Constitutional:  cooperative, alert and oriented, well developed, well nourished, in no acute distress        Skin:  warm and dry to the touch, no apparent skin lesions or masses noted          Head:  normocephalic, no masses or lesions        Eyes:  pupils equal and round, " conjunctivae and lids unremarkable, sclera white, no xanthalasma, EOMS intact, no nystagmus        Lymph:No Cervical lymphadenopathy present     ENT:  no pallor or cyanosis, dentition good        Neck:  carotid pulses are full and equal bilaterally, JVP normal, no carotid bruit        Respiratory:  normal breath sounds, clear to auscultation, normal A-P diameter, normal symmetry, normal respiratory excursion, no use of accessory muscles         Cardiac: regular rhythm, normal S1/S2, no S3 or S4, apical impulse not displaced, no murmurs, gallops or rubs                pulses full and equal, no bruits auscultated                                        GI:  abdomen soft, non-tender, BS normoactive, no mass, no HSM, no bruits        Extremities and Muscular Skeletal:  no deformities, clubbing, cyanosis, erythema observed              Neurological:  no gross motor deficits        Psych:  Alert and Oriented x 3        Recent Lab Results:  LIPID RESULTS:  Lab Results   Component Value Date    CHOL 130 10/09/2023    HDL 35 (L) 10/09/2023    LDL 62 10/09/2023    TRIG 166 (H) 10/09/2023       LIVER ENZYME RESULTS:  Lab Results   Component Value Date    AST 27 10/09/2023    ALT 16 10/09/2023       CBC RESULTS:  Lab Results   Component Value Date    WBC 11.2 (H) 10/09/2023    WBC 8.6 03/24/2016    RBC 5.03 10/09/2023    RBC 4.60 03/24/2016    HGB 16.1 10/09/2023    HGB 14.6 03/24/2016    HCT 46.0 10/09/2023    HCT 41.0 03/24/2016    MCV 92 10/09/2023    MCV 89 03/24/2016    MCH 32.0 10/09/2023    MCH 31.7 03/24/2016    MCHC 35.0 10/09/2023    MCHC 35.6 03/24/2016    RDW 11.9 10/09/2023    RDW 11.7 03/24/2016     10/09/2023     03/24/2016       BMP RESULTS:  Lab Results   Component Value Date     10/09/2023     03/25/2016    POTASSIUM 4.7 10/09/2023    POTASSIUM 3.8 03/25/2016    CHLORIDE 102 10/09/2023    CHLORIDE 110 (H) 03/25/2016    CO2 25 10/09/2023    CO2 24 03/25/2016    ANIONGAP 12 10/09/2023     ANIONGAP 5 03/25/2016    GLC 74 10/09/2023    GLC 91 09/08/2023    GLC 96 03/25/2016    BUN 17.9 10/09/2023    BUN 19 03/25/2016    CR 0.97 10/09/2023    CR 1.00 03/25/2016    GFRESTIMATED 89 10/09/2023    GFRESTIMATED 78 03/25/2016    GFRESTBLACK >90   GFR Calc   03/25/2016    NUBIA 9.9 10/09/2023    NUBIA 7.6 (L) 03/25/2016        A1C RESULTS:  Lab Results   Component Value Date    A1C 5.4 10/09/2023       INR RESULTS:  Lab Results   Component Value Date    INR 1.07 09/08/2023           CC  Froilan Ring MD  [id number]  [place] [id number]

## 2024-02-26 NOTE — LETTER
2/26/2024    Nathan Lockwood, CARLOTA CNP  [address] [facility] [address] [address]    RE: [place]       Dear Colleague,     I had the pleasure of seeing Kendell Caban in the [facility].  HPI and Plan:   Mr Caban returns today for follow-up of paroxysmal atrial fibrillation.  He is accompanied by his wife.    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    Despite high heart rates he had no symptoms.  He had initial rhythm monitoring which showed an over 50% atrial fibrillation burden.  As.he recovered from the above illness I repeated rhythm monitoring which now showed A-fib burden has decreased to a 20%    Exercise tolerance continues to be good.  He moves heavy objects in his work and is very physically active and he has no cardiac symptoms whatsoever.    Echocardiogram does show structurally normal heart with normal atrial sizes.  His aorta is borderline dilated measuring 4.0 cm    One-stage he was put on Xarelto by other providers but this was too cost prohibitive and more than 500 bucks a month.  He is currently on nothing and as he has a CHADS2 vascular score of 0 I do not think he needs to be on anything.    Cardiac examination is normal    This gentleman has lone atrial fibrillation.  I do not see any specific triggering factors for this condition.  I will keep him on no medications for now but going forward as he ages I think they will be a good chance that we may need some intervention, initially pharmacological    Provisionally have scheduled to see him again in 1 years time and he will have repeat rhythm monitoring prior to clinic visit.  Should he develop any symptoms I asked him to contact us.    Orders Placed This Encounter   Procedures    Follow-Up with Cardiology    Holter Monitor 24 hour Adult Pediatric       No orders  of the defined types were placed in this encounter.      Encounter Diagnosis   Name Primary?    Paroxysmal atrial fibrillation (H)        CURRENT MEDICATIONS:  Current Outpatient Medications   Medication Sig Dispense Refill    acetaminophen (TYLENOL) 325 MG tablet Take 2 tablets (650 mg) by mouth every 6 hours as needed for other (For optimal non-opioid multimodal pain management to improve pain control.) (Patient not taking: Reported on 10/9/2023)      apixaban ANTICOAGULANT (ELIQUIS) 5 MG tablet Take 1 tablet (5 mg) by mouth 2 times daily (Patient not taking: Reported on 10/16/2023) 180 tablet 0    metoprolol succinate ER (TOPROL XL) 25 MG 24 hr tablet Take 0.5 tablets (12.5 mg) by mouth daily (Patient not taking: Reported on 2/26/2024) 60 tablet 3    metoprolol tartrate (LOPRESSOR) 25 MG tablet TAKE 1 TABLET (25 MG) BY MOUTH 2 TIMES DAILY FOR 90 DAYS (Patient not taking: Reported on 2/26/2024) 180 tablet 0    pantoprazole (PROTONIX) 40 MG EC tablet Take 1 tablet (40 mg) by mouth daily (Patient not taking: Reported on 10/9/2023) 30 tablet 0    rivaroxaban ANTICOAGULANT (XARELTO) 20 MG TABS tablet Take 1 tablet (20 mg) by mouth daily (with dinner) (Patient not taking: Reported on 2/26/2024) 30 tablet 1       ALLERGIES     Allergies   Allergen Reactions    No Known Drug Allergy        PAST MEDICAL HISTORY:  Past Medical History:   Diagnosis Date    Cholelithiasis     Depression     Kidney stone     Paroxysmal A-fib (H) 09/07/2023    Unspecified viral hepatitis without mention of hepatic coma     as infant       PAST SURGICAL HISTORY:  Past Surgical History:   Procedure Laterality Date    COMBINED CYSTOSCOPY, RETROGRADES, URETEROSCOPY, LASER HOLMIUM LITHOTRIPSY URETER(S), INSERT STENT Left 3/25/2016    Procedure: COMBINED CYSTOSCOPY, RETROGRADES, URETEROSCOPY, LASER HOLMIUM LITHOTRIPSY URETER(S), INSERT STENT;  Surgeon: Gen Funes MD;  Location:  OR    ENDOSCOPIC RETROGRADE CHOLANGIOPANCREATOGRAM N/A  9/8/2023    Procedure: ENDOSCOPIC RETROGRADE CHOLANGIOPANCREATOGRAPHY;  Surgeon: Craig Pfeiffer MD;  Location: [facility] OR    LAPAROSCOPIC CHOLECYSTECTOMY N/A 9/9/2023    Procedure: CHOLECYSTECTOMY, LAPAROSCOPIC;  Surgeon: Marco Almaraz MD;  Location: [facility] OR    REMV PILONIDAL LESION SIMPLE         FAMILY HISTORY:  Family History   Problem Relation Age of Onset    Alzheimer Disease No family hx of     Cancer Paternal Aunt         colon    Cancer Paternal Aunt         coloc    Diabetes No family hx of     Heart Disease No family hx of     Cerebrovascular Disease Brother         age 40    Cancer Father     Cancer Paternal Grandfather        SOCIAL HISTORY:  Social History     Socioeconomic History    Marital status:      Spouse name: None    Number of children: None    Years of education: None    Highest education level: None   Tobacco Use    Smoking status: Never    Smokeless tobacco: Never   Substance and Sexual Activity    Alcohol use: No     Comment: Very rare    Drug use: No     Comment: drinks quite a bit of caffeine    Sexual activity: Yes     Partners: Female     Social Determinants of Health     Financial Resource Strain: Low Risk  (10/19/2023)    Financial Resource Strain     Within the past 12 months, have you or your family members you live with been unable to get utilities (heat, electricity) when it was really needed?: No   Food Insecurity: Low Risk  (10/19/2023)    Food Insecurity     Within the past 12 months, did you worry that your food would run out before you got money to buy more?: No     Within the past 12 months, did the food you bought just not last and you didn t have money to get more?: No   Transportation Needs: Low Risk  (10/19/2023)    Transportation Needs     Within the past 12 months, has lack of transportation kept you from medical appointments, getting your medicines, non-medical meetings or appointments, work, or from getting things that you need?: No   Physical  "Activity: Sufficiently Active (10/19/2023)    Exercise Vital Sign     Days of Exercise per Week: 7 days     Minutes of Exercise per Session: 150+ min   Stress: Stress Concern Present (10/19/2023)    [place] of Occupational Health - Occupational Stress Questionnaire     Feeling of Stress : To some extent   Social Connections: Socially Isolated (10/19/2023)    Social Connection and Isolation Panel [NHANES]     Frequency of Communication with Friends and Family: More than three times a week     Frequency of Social Gatherings with Friends and Family: More than three times a week     Attends Confucianism Services: Never     Active Member of Clubs or Organizations: No     Attends Club or Organization Meetings: Never     Marital Status:    Interpersonal Safety: Low Risk  (10/19/2023)    Interpersonal Safety     Do you feel physically and emotionally safe where you currently live?: Yes     Within the past 12 months, have you been hit, slapped, kicked or otherwise physically hurt by someone?: No     Within the past 12 months, have you been humiliated or emotionally abused in other ways by your partner or ex-partner?: No   Housing Stability: High Risk (10/19/2023)    Housing Stability     Do you have housing? : Yes     Are you worried about losing your housing?: Yes       Review of Systems:  Skin:  not assessed     Eyes:  not assessed    ENT:  not assessed    Respiratory:  Negative    Cardiovascular:  Negative    Gastroenterology: not assessed    Genitourinary:  not assessed    Musculoskeletal:  not assessed    Neurologic:  not assessed    Psychiatric:  not assessed    Heme/Lymph/Imm:  not assessed    Endocrine:  not assessed      Physical Exam:  Vitals: /74 (BP Location: Right arm, Patient Position: Sitting, Cuff Size: Adult Regular)   Pulse 72   Ht 1.803 m (5' 11\")   Wt 93.5 kg (206 lb 3.2 oz)   SpO2 96%   BMI 28.76 kg/m      Constitutional:  cooperative, alert and oriented, well developed, well " nourished, in no acute distress        Skin:  warm and dry to the touch, no apparent skin lesions or masses noted          Head:  normocephalic, no masses or lesions        Eyes:  pupils equal and round, conjunctivae and lids unremarkable, sclera white, no xanthalasma, EOMS intact, no nystagmus        Lymph:No Cervical lymphadenopathy present     ENT:  no pallor or cyanosis, dentition good        Neck:  carotid pulses are full and equal bilaterally, JVP normal, no carotid bruit        Respiratory:  normal breath sounds, clear to auscultation, normal A-P diameter, normal symmetry, normal respiratory excursion, no use of accessory muscles         Cardiac: regular rhythm, normal S1/S2, no S3 or S4, apical impulse not displaced, no murmurs, gallops or rubs                pulses full and equal, no bruits auscultated                                        GI:  abdomen soft, non-tender, BS normoactive, no mass, no HSM, no bruits        Extremities and Muscular Skeletal:  no deformities, clubbing, cyanosis, erythema observed              Neurological:  no gross motor deficits        Psych:  Alert and Oriented x 3        Recent Lab Results:  LIPID RESULTS:  Lab Results   Component Value Date    CHOL 130 10/09/2023    HDL 35 (L) 10/09/2023    LDL 62 10/09/2023    TRIG 166 (H) 10/09/2023       LIVER ENZYME RESULTS:  Lab Results   Component Value Date    AST 27 10/09/2023    ALT 16 10/09/2023       CBC RESULTS:  Lab Results   Component Value Date    WBC 11.2 (H) 10/09/2023    WBC 8.6 03/24/2016    RBC 5.03 10/09/2023    RBC 4.60 03/24/2016    HGB 16.1 10/09/2023    HGB 14.6 03/24/2016    HCT 46.0 10/09/2023    HCT 41.0 03/24/2016    MCV 92 10/09/2023    MCV 89 03/24/2016    MCH 32.0 10/09/2023    MCH 31.7 03/24/2016    MCHC 35.0 10/09/2023    MCHC 35.6 03/24/2016    RDW 11.9 10/09/2023    RDW 11.7 03/24/2016     10/09/2023     03/24/2016       BMP RESULTS:  Lab Results   Component Value Date     10/09/2023      03/25/2016    POTASSIUM 4.7 10/09/2023    POTASSIUM 3.8 03/25/2016    CHLORIDE 102 10/09/2023    CHLORIDE 110 (H) 03/25/2016    CO2 25 10/09/2023    CO2 24 03/25/2016    ANIONGAP 12 10/09/2023    ANIONGAP 5 03/25/2016    GLC 74 10/09/2023    GLC 91 09/08/2023    GLC 96 03/25/2016    BUN 17.9 10/09/2023    BUN 19 03/25/2016    CR 0.97 10/09/2023    CR 1.00 03/25/2016    GFRESTIMATED 89 10/09/2023    GFRESTIMATED 78 03/25/2016    GFRESTBLACK >90   GFR Calc   03/25/2016    NUBIA 9.9 10/09/2023    NUBIA 7.6 (L) 03/25/2016        A1C RESULTS:  Lab Results   Component Value Date    A1C 5.4 10/09/2023       INR RESULTS:  Lab Results   Component Value Date    INR 1.07 09/08/2023           CC  Shelley Ring MD  [address]  [place] [address]                     Thank you for allowing me to participate in the care of your patient.      Sincerely,     DR SHELLEY RING MD     [facility]

## 2024-02-27 ENCOUNTER — PATIENT OUTREACH (OUTPATIENT)
Dept: FAMILY MEDICINE | Facility: CLINIC | Age: 64
End: 2024-02-27
Payer: COMMERCIAL

## 2024-02-27 NOTE — TELEPHONE ENCOUNTER
Patient Quality Outreach    Patient is due for the following:   Colon Cancer Screening  Physical Preventive Adult Physical      Topic Date Due    COVID-19 Vaccine (1) Never done    Pneumococcal Vaccine (1 of 2 - PCV) Never done    Zoster (Shingles) Vaccine (1 of 2) Never done    Flu Vaccine (1) Never done       Next Steps:   Schedule a Adult Preventative    Type of outreach:    Sent letter.      Questions for provider review:    None           Derick Osuna MA  Chart routed to Care Team.

## 2024-03-01 ENCOUNTER — PATIENT OUTREACH (OUTPATIENT)
Dept: CARE COORDINATION | Facility: CLINIC | Age: 64
End: 2024-03-01
Payer: COMMERCIAL

## 2024-03-01 DIAGNOSIS — Z71.89 OTHER SPECIFIED COUNSELING: Primary | Chronic | ICD-10-CM

## 2024-03-01 NOTE — PROGRESS NOTES
Clinic Care Coordination Contact  Community Health Worker Follow Up    Care Gaps:     Health Maintenance Due   Topic Date Due    YEARLY PREVENTIVE VISIT  Never done    ADVANCE CARE PLANNING  Never done    COVID-19 Vaccine (1) Never done    Pneumococcal Vaccine: Pediatrics (0 to 5 Years) and At-Risk Patients (6 to 64 Years) (1 of 2 - PCV) Never done    COLORECTAL CANCER SCREENING  Never done    HIV SCREENING  Never done    HEPATITIS C SCREENING  Never done    ZOSTER IMMUNIZATION (1 of 2) Never done    RSV VACCINE (Pregnancy & 60+) (1 - 1-dose 60+ series) Never done    INFLUENZA VACCINE (1) Never done       Patient has been dealing with insurance issues, will schedule when it fixed     Care Plan:   Care Plan: Health Maintenance       Problem: Health Maintenance Due or Overdue       Goal: Become up-to-date with health maintenance visit(s)       Start Date: 10/19/2023 Expected End Date: 5/23/2024    This Visit's Progress: 40% Recent Progress: 30%    Priority: Low    Note:     Barriers: overdue for an yearly preventative exam, needs insurance   Strengths: patient will consider scheduling this exam   Patient expressed understanding of goal: yes  Action steps to achieve this goal:  1. I will find time to call and schedule yearly exam   2. I will discuss my need for yearly exam                                 Intervention and Education during outreach: Patient states that he was working with [facility] but had a hard time connecting. He is needing help with [employer] application. [facility] did place a new order for [facility].     He did follow up with cardiology recently and was able to stop his blood thinners and other heart medications.     [facility] Plan: [facility] will continue to support patient with goals through routine scheduled outreach.     Next outreach due: 4/1/24

## 2024-03-04 ENCOUNTER — PATIENT OUTREACH (OUTPATIENT)
Dept: CARE COORDINATION | Facility: CLINIC | Age: 64
End: 2024-03-04
Payer: COMMERCIAL

## 2024-03-05 ENCOUNTER — PATIENT OUTREACH (OUTPATIENT)
Dept: CARE COORDINATION | Facility: CLINIC | Age: 64
End: 2024-03-05
Payer: COMMERCIAL

## 2024-03-05 NOTE — LETTER
[facility]  [address] [facility] [address]   March 28, 2024        Kendell Caban  [address] [place] [id number]          Dear Walter Rdz is an updated Patient Centered Plan of Care for your continued enrollment in Care Coordination. Please let us know if you have additional questions, concerns, or goals that we can assist with.    Sincerely,    Vivian Taylor, BARBW, MSW Clinic   [facility]  Care [facility]   [email or web address]  Office: [phone]  Employed by [facility]  Patient Centered Plan of Care  About Me:        Patient Name:  Kendell Caban    YOB: 1960  Age:         63 year old   [place] MRN:    [id number] Telephone Information:  Home Phone [phone]   Mobile [phone]       Address:  [address] [place] [address] Email address:  [email or web address]      Emergency Contact(s)    Name Relationship Lgl Grd Work Phone Home Phone Mobile Phone   1. PENNIE THOMPSON Significant ot*    [phone]   2. FABIÁN BRIONES Daughter    [phone]           Primary language:  English     needed? No   [employer]:  [phone] op. 1  Other communication barriers:None    Preferred Method of Communication:   Maxwell  Current living arrangement: I live alone; I live in a private home    Mobility Status/ Medical Equipment: Independent        Health Maintenance  Health Maintenance Reviewed: Due/Overdue   Health Maintenance Due   Topic Date Due    YEARLY PREVENTIVE VISIT  Never done    ADVANCE CARE PLANNING  Never done    Pneumococcal Vaccine: Pediatrics (0 to 5 Years) and At-Risk Patients (6 to 64 Years) (1 of 2 - PCV) Never done    COLORECTAL CANCER SCREENING  Never done    HIV SCREENING  Never done    HEPATITIS C SCREENING  Never done    ZOSTER IMMUNIZATION (1 of 2)  Never done    RSV VACCINE (Pregnancy & 60+) (1 - 1-dose 60+ series) Never done    INFLUENZA VACCINE (1) Never done    COVID-19 Vaccine (1 - 2023-24 season) Never done          My Access Plan  Medical Emergency 911   Primary Clinic Line [facility] - [phone]   24 Hour Appointment Line [phone] or  [phone] (toll-free)   24 Hour Nurse Line [phone] (toll-free)   Preferred Urgent Care Other     Preferred Hospital [facility]  [phone]     Preferred Pharmacy [id number] [place], [place] - [address]     Behavioral Health Crisis Line The National Suicide Prevention Lifeline at 1-191.574.4513 or Text/Call 098           My Care Team Members  Patient Care Team         Relationship Specialty Notifications Start End    Nathan Lockwood APRN CNP PCP - General Family Medicine  10/19/23     Phone: [phone] Fax: [phone]         [address] [place] [address]    Nathan Lockwood APRN CNP Assigned PCP   9/21/23     Phone: [phone] Fax: [phone]         [address] [place] [address]    Nathan Lockwood APRN CNP Nurse Practitioner Family Medicine  10/10/23     Phone: [phone] Fax: [phone]         [address] [place] [address]    Froilan Ring MD MD Cardiovascular Disease  10/10/23     Phone: [phone] Fax: [phone] [address] [place] [address]    Vivian Taylor BSW Lead Care Coordinator Primary Care - CC Admissions 10/16/23     Phone: [phone] Fax: [phone]        Jennifer Baird CHW Community Health Worker Primary Care - CC Admissions 10/19/23     Phone: [phone] Fax: [phone]        Froilan Ring MD Assigned Heart and Vascular Provider   10/21/23     Phone: [phone] Fax: [phone] [address] [place] [address]    Antonio Murrell, [facility] Financial Resource Worker   3/4/24     Phone: [phone]                      My Care Plans  Self Management and Treatment Plan    Care Plan  Care Plan: Health Maintenance       Problem: Health Maintenance Due or Overdue       Goal: Become up-to-date with health maintenance visit(s)       Start Date: 10/19/2023 Expected End Date: 5/23/2024    Recent Progress: 40%    Priority: Low    Note:     Barriers: overdue for an yearly preventative exam, needs insurance   Strengths: patient will consider scheduling this exam. Working with FRW on insurance needs   Patient expressed understanding of goal: yes  Action steps to achieve this goal:  1. I will find time to call and schedule yearly exam   2. I will discuss my need for yearly exam                                 Action Plans on File:                       Advance Care Plans/Directives:   Advanced Care Plan/Directives on file:   No    Discussed with patient/caregiver(s): No data recorded           My Medical and Care Information  Problem List   Patient Active Problem List   Diagnosis    Genital herpes    Renal colic    New onset atrial fibrillation (H)    Gall stone pancreatitis          Care Coordination Start Date: 10/12/2023   Frequency of Care Coordination: monthly, more frequently as needed     Form Last Updated: 03/28/2024

## 2024-03-05 NOTE — PROGRESS NOTES
Clinic Care Coordination Contact  Care Coordination Clinician Chart Review    Situation: Patient chart reviewed by Care Coordinator.       Background: Care Coordination Program started: 10/12/2023. Initial assessment completed and patient-centered care plan(s) were developed with participation from patient. Lead CC handed patient off to CHW for continued outreaches.       Assessment: Per chart review, patient outreach completed by CC CHW on 3/1/2024.  Patient is not actively working to accomplish goal(s). He is in process of obtaining health insurance to address care gaps.  However, is having a difficult time connecting with them.  CHW placed a new FRW referral.  Patient reports need for assistance to complete the [facility] Care application. Patient recently followed up with cardiology, was given approval to stop his blood thinners and other heart medications. Patient's goal(s) appropriate and relevant at this time. Patient is not due for updated Plan of Care.  Assessments will be completed annually or as needed/with change of patient status.      Care Plan: Health Maintenance       Problem: Health Maintenance Due or Overdue       Goal: Become up-to-date with health maintenance visit(s)       Start Date: 10/19/2023 Expected End Date: 5/23/2024    Recent Progress: 40%    Priority: Low    Note:     Barriers: overdue for an yearly preventative exam, needs insurance   Strengths: patient will consider scheduling this exam. Working with FRW on insurance needs   Patient expressed understanding of goal: yes  Action steps to achieve this goal:  1. I will find time to call and schedule yearly exam   2. I will discuss my need for yearly exam                                    Plan/Recommendations: The patient will continue working with Care Coordination to achieve goal(s) as above. CHW will continue outreaches at minimum every 30 days and will involve Lead CC as needed or if patient is ready to move to Maintenance. Lead CC will  continue to monitor CHW outreaches and patient's progress to goal(s) every 6 weeks.     Plan of Care updated and sent to patient: No    HOWIE Paredes, MSW   [facility]  Care Coordination  [facility]  [phone]  3/5/2024 8:32 AM

## 2024-03-08 ENCOUNTER — PATIENT OUTREACH (OUTPATIENT)
Dept: CARE COORDINATION | Facility: CLINIC | Age: 64
End: 2024-03-08
Payer: COMMERCIAL

## 2024-03-28 NOTE — TELEPHONE ENCOUNTER
Patient Quality Outreach    Patient is due for the following:   Colon Cancer Screening  Physical Preventive Adult Physical      Topic Date Due    Pneumococcal Vaccine (1 of 2 - PCV) Never done    Zoster (Shingles) Vaccine (1 of 2) Never done    Flu Vaccine (1) Never done    COVID-19 Vaccine (1 - 2023-24 season) Never done       Next Steps:   Schedule a Adult Preventative    Type of outreach:    Sent [employer] message.    Next Steps:  Reach out today via [employer].    Max number of attempts reached: Yes. Will try again in 90 days if patient still on fail list.    Questions for provider review:    None           Derick Osuna MA

## 2024-04-03 ENCOUNTER — PATIENT OUTREACH (OUTPATIENT)
Dept: CARE COORDINATION | Facility: CLINIC | Age: 64
End: 2024-04-03
Payer: COMMERCIAL

## 2024-04-03 NOTE — PROGRESS NOTES
Established patient outreach attempted x 1 was unable to reach. Left message on voicemail with call back information and requested return call.  Plan: Current outreach date reflects FRW 's follow up within 30 days.

## 2024-04-08 ENCOUNTER — PATIENT OUTREACH (OUTPATIENT)
Dept: CARE COORDINATION | Facility: CLINIC | Age: 64
End: 2024-04-08
Payer: COMMERCIAL

## 2024-04-08 NOTE — Clinical Note
Patient states that he sent his paperwork in for misael care application and isn't sure if it was received and what the status is. Would like a call back regarding this.

## 2024-04-10 ENCOUNTER — PATIENT OUTREACH (OUTPATIENT)
Dept: CARE COORDINATION | Facility: CLINIC | Age: 64
End: 2024-04-10
Payer: COMMERCIAL

## 2024-04-15 ENCOUNTER — PATIENT OUTREACH (OUTPATIENT)
Dept: CARE COORDINATION | Facility: CLINIC | Age: 64
End: 2024-04-15
Payer: COMMERCIAL

## 2024-04-15 NOTE — LETTER
[facility]  [address] [facility] [address]   June 19, 2024        Kendell Caban  [address] [place] [address]          Dear Walter Rdz is an updated Patient Centered Plan of Care for your continued enrollment in Care Coordination. Please let us know if you have additional questions, concerns, or goals that we can assist with.    Sincerely,    Vivian Taylor, BARBW, MSW Clinic   [facility]  Care [facility]   [email or web address]  Office: [phone]  [place]ed [place] [employer] [facility] [place]  Patient Centered Plan of Care  About Me:        Patient Name:  Kendell Caban    YOB: 1960  Age:         63 year old   [place] MRN:    [id number] Telephone Information:  Home Phone [phone]   Mobile [phone]       Address:  [address] [place] [address] Email address:  [email or web address]      Emergency Contact(s)    Name Relationship Lgl Grd Work Phone Home Phone Mobile Phone   1. PENNIE THOMPSON Significant ot*    [phone]   2. FABIÁN BRIONES Daughter    [phone]           Primary language:  English     needed? No   [place] [employer]:  [phone] op. 1  Other communication barriers:None    Preferred Method of Communication:   Maxwell  Current living arrangement: I live alone; I live in a private home    Mobility Status/ Medical Equipment: Independent        Health Maintenance  Health Maintenance Reviewed: Due/Overdue  (vaccines, advanced Care planning,Yearly preventative visit.)      My Access Plan  Medical Emergency 911   Primary Clinic Line [facility] - [phone]   24 Hour Appointment Line [phone] or  [phone]) (toll-free)   24 Hour Nurse Line [phone] (toll-free)   Preferred Urgent Care Other     Preferred [facility]  [phone]     Preferred Pharmacy [id number] [address]  [address]     Behavioral Health Crisis Line The National Suicide Prevention Lifeline at 1-852.485.6927 or Text/Call 498           My Care Team Members  Patient Care Team         Relationship Specialty Notifications Start End    Nathan Lockwood APRN CNP PCP - General Family Medicine  10/19/23     Phone: [phone] Fax: [phone]         [address] [place] [address]    Nathan Lockwood APRN CNP Assigned PCP   9/21/23     Phone: [phone] Fax: [phone]         [address] [place] [address]    Nathan Lockwood APRN CNP Nurse Practitioner Family Medicine  10/10/23     Phone: [phone] Fax: [phone]         [address] [place] [address]    Froilan Ring MD MD Cardiovascular Disease  10/10/23     Phone: [phone] Fax: [phone] [address] [place] [address]    Vivian Taylor BSW Lead Care Coordinator Primary Care - CC Admissions 10/16/23     Phone: [phone] Fax: [phone]        Jennifer Baird CHW Community Health Worker Primary Care - CC Admissions 10/19/23     Phone: [phone] Fax: [phone]        Froilan Ring MD Assigned Heart and Vascular Provider   10/21/23     Phone: [phone] Fax: [phone] [address] [place] [address]    Antonio Murrell, [facility] Financial Resource Worker   3/4/24     Phone: [phone]                     My Care Plans  Self Management and Treatment Plan    Care Plan  Care Plan: Health Maintenance       Problem: Health Maintenance Due or Overdue       Goal: Become up-to-date with health maintenance visit(s)       Start Date: 10/19/2023 Expected End Date: 7/26/2024    This Visit's Progress: 60% Recent Progress: 50%    Priority: Low    Note:     Barriers: overdue for an yearly preventative exam, needs insurance   Strengths: patient will consider scheduling this  exam. Working with FRW on insurance needs   Patient expressed understanding of goal: yes  Action steps to achieve this goal:  1. I will find time to call and schedule yearly exam   2. I will discuss my need for yearly exam                                 Action Plans on File: none                      Advance Care Plans/Directives:   Advanced Care Plan/Directives on file:   No    Discussed with patient/caregiver(s): No data recorded           My Medical and Care Information  Problem List   Patient Active Problem List   Diagnosis    Genital herpes    Renal colic    New onset atrial fibrillation (H)    Gall stone pancreatitis          Care Coordination Start Date: 10/12/2023   Frequency of Care Coordination: monthly, more frequently as needed     Form Last Updated: 06/19/2024

## 2024-04-15 NOTE — PROGRESS NOTES
Clinic Care Coordination Contact  Care Coordination Clinician Chart Review    Situation: Patient chart reviewed by Care Coordinator.       Background: Care Coordination Program started: 10/12/2023. Initial assessment completed and patient-centered care plan(s) were developed with participation from patient. Lead CC handed patient off to CHW for continued outreaches.       Assessment: Per chart review, patient outreach completed by CC CHW on 4/8/2024.  Patient is working with the FRW on insurance needs and [facility] Care.  Per CHW note, patient will call and schedule clinic visit.  He has completed Kira Care application and sent in, has not yet heard a decision.  Patient reported having stated to receive letters on his outstanding [place] balance, CHW sent request to FRW to follow up with patient.    Patient is not actively working to accomplish goal(s). Patient's goal(s) appropriate and relevant at this time. Patient is not due for updated Plan of Care.  Assessments will be completed annually or as needed/with change of patient status.      Care Plan: Health Maintenance       Problem: Health Maintenance Due or Overdue       Goal: Become up-to-date with health maintenance visit(s)       Start Date: 10/19/2023 Expected End Date: 7/26/2024    Recent Progress: 50%    Priority: Low    Note:     Barriers: overdue for an yearly preventative exam, needs insurance   Strengths: patient will consider scheduling this exam. Working with FRW on insurance needs   Patient expressed understanding of goal: yes  Action steps to achieve this goal:  1. I will find time to call and schedule yearly exam   2. I will discuss my need for yearly exam                                    Plan/Recommendations: The patient will continue working with Care Coordination to achieve goal(s) as above. CHW will continue outreaches at minimum every 30 days and will involve Lead CC as needed or if patient is ready to move to Maintenance. Lead CC will  continue to monitor CHW outreaches and patient's progress to goal(s) every 6 weeks.     Plan of Care updated and sent to patient: No    HOWIE Paredes, MSW   [facility]  Care Coordination  [facility]  [phone]  4/15/2024 12:47 PM

## 2024-04-24 ENCOUNTER — PATIENT OUTREACH (OUTPATIENT)
Dept: CARE COORDINATION | Facility: CLINIC | Age: 64
End: 2024-04-24
Payer: COMMERCIAL

## 2024-05-16 ENCOUNTER — PATIENT OUTREACH (OUTPATIENT)
Dept: CARE COORDINATION | Facility: CLINIC | Age: 64
End: 2024-05-16
Payer: COMMERCIAL

## 2024-05-16 NOTE — PROGRESS NOTES
Clinic Care Coordination Contact  Community Health Worker Follow Up    Care Gaps:     Health Maintenance Due   Topic Date Due    YEARLY PREVENTIVE VISIT  Never done    ADVANCE CARE PLANNING  Never done    Pneumococcal Vaccine: Pediatrics (0 to 5 Years) and At-Risk Patients (6 to 64 Years) (1 of 2 - PCV) Never done    COLORECTAL CANCER SCREENING  Never done    HIV SCREENING  Never done    HEPATITIS C SCREENING  Never done    ZOSTER IMMUNIZATION (1 of 2) Never done    RSV VACCINE (Pregnancy & 60+) (1 - 1-dose 60+ series) Never done    COVID-19 Vaccine (1 - 2023-24 season) Never done       Patient will address care gaps when he has active insurance.     Care Plan:   Care Plan: Health Maintenance       Problem: Health Maintenance Due or Overdue       Goal: Become up-to-date with health maintenance visit(s)       Start Date: 10/19/2023 Expected End Date: 7/26/2024    This Visit's Progress: 60% Recent Progress: 50%    Priority: Low    Note:     Barriers: overdue for an yearly preventative exam, needs insurance   Strengths: patient will consider scheduling this exam. Working with FRW on insurance needs   Patient expressed understanding of goal: yes  Action steps to achieve this goal:  1. I will find time to call and schedule yearly exam   2. I will discuss my need for yearly exam                                 Intervention and Education during outreach: Patient states that he got notification that his Kira Care application wasn't received although he received it back typed up with his answers given. CHW gave him the number to call [facility]. He has been working with FRW and does have her contact information as well.     Plan: CHW will continue to support patient with goals through routine scheduled outreach.     Next outreach due: 6/13/24

## 2024-05-22 ENCOUNTER — PATIENT OUTREACH (OUTPATIENT)
Dept: CARE COORDINATION | Facility: CLINIC | Age: 64
End: 2024-05-22
Payer: COMMERCIAL

## 2024-05-23 ENCOUNTER — PATIENT OUTREACH (OUTPATIENT)
Dept: CARE COORDINATION | Facility: CLINIC | Age: 64
End: 2024-05-23
Payer: COMMERCIAL

## 2024-05-23 NOTE — PROGRESS NOTES
Care Coordination Clinician Chart Review    Situation: Patient chart reviewed by  Care Coordinator.       Background: Care Coordination Program started: 10/12/2023. Initial assessment completed and patient-centered care plan(s) were developed with participation from patient. Lead CC handed patient off to CHW for continued outreaches.       Assessment: Per chart review, patient outreach completed by CC CHW on 5/16/24.  Patient is actively working to accomplish goal(s).  Working with FRW. On Kira Care.  Has not yet scheduled Yearly PCP visit. Patient's goal(s) appropriate and relevant at this time. Patient is not due for updated Plan of Care.  Assessments will be completed annually or as needed/with change of patient status.      Care Plan: Health Maintenance       Problem: Health Maintenance Due or Overdue       Goal: Become up-to-date with health maintenance visit(s)       Start Date: 10/19/2023 Expected End Date: 7/26/2024    This Visit's Progress: 60% Recent Progress: 50%    Priority: Low    Note:     Barriers: overdue for an yearly preventative exam, needs insurance   Strengths: patient will consider scheduling this exam. Working with FRW on insurance needs   Patient expressed understanding of goal: yes  Action steps to achieve this goal:  1. I will find time to call and schedule yearly exam   2. I will discuss my need for yearly exam                                    Plan/Recommendations: The patient will continue working with Care Coordination to achieve goal(s) as above. CHW will continue outreaches at minimum every 30 days and will involve Lead CC as needed or if patient is ready to move to Maintenance. Lead CC will continue to monitor CHW outreaches and patient's progress to goal(s) every 6 weeks.     Plan of Care updated and sent to patient: HOWIE Urbina / CHARLIE Spicer  [facility]   Care Coordination  [facility]  5/23/2024 11:05 AM

## 2024-06-18 ENCOUNTER — PATIENT OUTREACH (OUTPATIENT)
Dept: CARE COORDINATION | Facility: CLINIC | Age: 64
End: 2024-06-18
Payer: COMMERCIAL

## 2024-06-18 NOTE — PROGRESS NOTES
[facility]/Voicemail    Clinical Data: Care Coordinator Outreach    Outreach Documentation Number of Outreach Attempt   6/18/2024   9:28 AM 1       Left message on patient's voicemail with call back information and requested return call.    Plan:   Care Coordinator will try to reach patient again in 3-5 business days.    Next outreach due: 6/25/24

## 2024-06-26 ENCOUNTER — PATIENT OUTREACH (OUTPATIENT)
Dept: CARE COORDINATION | Facility: CLINIC | Age: 64
End: 2024-06-26
Payer: COMMERCIAL

## 2024-06-26 NOTE — LETTER
[facility]  [address] [facility] [address]    June 26, 2024    Kendell Caban  [address] [facility] [address]      Dear Kendell,    I have been attempting to reach you since our last contact. I would like to continue to work with you and provide any additional support you may need on achieving your health care related goals. I would appreciate if you would give me a call at [phone] to let me know if you would like to continue working together. I know that there are many things that can affect our ability to communicate and I hope we can continue to work together.    All of us at the [facility] are invested in your health and are here to assist you in meeting your goals.     Sincerely,    GABE Mcgee

## 2024-06-26 NOTE — PROGRESS NOTES
[facility]/Voicemail    Clinical Data: Care Coordinator Outreach    Outreach Documentation Number of Outreach Attempt   6/18/2024   9:28 AM 1   6/26/2024  11:23 AM 2       Left message on patient's voicemail with call back information and requested return call.    Plan: Care Coordinator will send unable to contact letter with care coordinator contact information via [employer]. Care Coordinator will try to reach patient again in 10 business days.    Next outreach due: 7/10/24

## 2024-07-03 ENCOUNTER — PATIENT OUTREACH (OUTPATIENT)
Dept: CARE COORDINATION | Facility: CLINIC | Age: 64
End: 2024-07-03
Payer: COMMERCIAL

## 2024-07-03 NOTE — PROGRESS NOTES
Clinic Care Coordination Contact  Community Health Worker Follow Up    Care Gaps:     Health Maintenance Due   Topic Date Due    YEARLY PREVENTIVE VISIT  Never done    ADVANCE CARE PLANNING  Never done    Pneumococcal Vaccine: Pediatrics (0 to 5 Years) and At-Risk Patients (6 to 64 Years) (1 of 2 - PCV) Never done    COLORECTAL CANCER SCREENING  Never done    HIV SCREENING  Never done    HEPATITIS C SCREENING  Never done    ZOSTER IMMUNIZATION (1 of 2) Never done    RSV VACCINE (Pregnancy & 60+) (1 - 1-dose 60+ series) Never done    COVID-19 Vaccine (1 - 2023-24 season) Never done       Patient will schedule when his insurance and billing is figured out.    Care Plan:   Care Plan: Health Maintenance       Problem: Health Maintenance Due or Overdue       Goal: Become up-to-date with health maintenance visit(s)       Start Date: 10/19/2023 Expected End Date: 7/26/2024    This Visit's Progress: 70% Recent Progress: 60%    Priority: Low    Note:     Barriers: overdue for an yearly preventative exam, needs insurance   Strengths: patient will consider scheduling this exam. Working with FRW on insurance needs   Patient expressed understanding of goal: yes  Action steps to achieve this goal:  1. I will find time to call and schedule yearly exam   2. I will discuss my need for yearly exam                                 Intervention and Education during outreach: Patient states he has not heard anything about his [facility] application that he sent it. He did get a call from a different FRW iris Son and he left her a message. He is worried that this is taking so long that he is going to be stuck with this large bill that he can't pay.     CHW Plan: CHW will continue to support patient with goals through routine scheduled outreach.     Next outreach due: 8/5/24

## 2024-07-05 ENCOUNTER — PATIENT OUTREACH (OUTPATIENT)
Dept: CARE COORDINATION | Facility: CLINIC | Age: 64
End: 2024-07-05
Payer: COMMERCIAL

## 2024-07-05 NOTE — PROGRESS NOTES
Clinic Care Coordination Contact  Care Coordination Clinician Chart Review    Situation: Patient chart reviewed by Care Coordinator.       Background: Care Coordination Program started: 10/12/2023. Initial assessment completed and patient-centered care plan(s) were developed with participation from patient. Lead CC handed patient off to CHW for continued outreaches.       Assessment: Per chart review, patient outreach completed by CC CHW on 7/3/2024 . Per CHW note, patient will address care gaps once insurance and billing issues are organized.   Per CHW note, Patient stated not yet having heard about [facility].  He did get a call from a different FRW today and he left her a message. He is worried this is taking so long and he will be stuck a large bill he can't pay.       Patient is actively working to accomplish goal(s). Patient's goal(s) appropriate and relevant at this time. Patient is not due for updated Plan of Care.  Assessments will be completed annually or as needed/with change of patient status.      Care Plan: Health Maintenance       Problem: Health Maintenance Due or Overdue       Goal: Become up-to-date with health maintenance visit(s)       Start Date: 10/19/2023 Expected End Date: 9/4/2024    Recent Progress: 70%    Priority: Low    Note:     Barriers: overdue for an yearly preventative exam, needs insurance   Strengths: patient will consider scheduling this exam once insurance is organized.  Working with FRW on insurance needs   Patient expressed understanding of goal: yes  Action steps to achieve this goal:  1. I will find time to call and schedule yearly exam   2. I will discuss my need for yearly exam                                    Plan/Recommendations: The patient will continue working with Care Coordination to achieve goal(s) as above. CHW will continue outreaches at minimum every 30 days and will involve Lead CC as needed or if patient is ready to move to Maintenance. Lead CC will  continue to monitor CHW outreaches and patient's progress to goal(s) every 6 weeks.     Plan of Care updated and sent to patient: No  HOWIE Paredes, MSW   [facility]  Care Coordination  [facility]  [phone]  7/5/2024 2:17 PM

## 2024-07-18 ENCOUNTER — PATIENT OUTREACH (OUTPATIENT)
Dept: CARE COORDINATION | Facility: CLINIC | Age: 64
End: 2024-07-18
Payer: COMMERCIAL

## 2024-07-24 ENCOUNTER — PATIENT OUTREACH (OUTPATIENT)
Dept: CARE COORDINATION | Facility: CLINIC | Age: 64
End: 2024-07-24
Payer: COMMERCIAL

## 2024-08-05 ENCOUNTER — PATIENT OUTREACH (OUTPATIENT)
Dept: CARE COORDINATION | Facility: CLINIC | Age: 64
End: 2024-08-05
Payer: COMMERCIAL

## 2024-08-07 ENCOUNTER — DOCUMENTATION ONLY (OUTPATIENT)
Dept: ANTICOAGULATION | Facility: CLINIC | Age: 64
End: 2024-08-07
Payer: COMMERCIAL

## 2024-08-07 NOTE — PROGRESS NOTES
Anticoagulant Therapeutic Duplication    Duplicate orders identified: different medication of the same therapeutic class    Both medications have been stopped by patient and discontinued    Active anticoagulant: none    Plan made per [facility] anticoagulation protocol.    Ehsan Black RN  8/7/2024

## 2024-08-09 ENCOUNTER — PATIENT OUTREACH (OUTPATIENT)
Dept: CARE COORDINATION | Facility: CLINIC | Age: 64
End: 2024-08-09
Payer: COMMERCIAL

## 2024-08-09 NOTE — PROGRESS NOTES
[facility]/Voicemail    Clinical Data: Care Coordinator Outreach    Outreach Documentation Number of Outreach Attempt   6/26/2024  11:23 AM 2   8/9/2024  11:11 AM 1       Left message on patient's voicemail with call back information and requested return call.    Plan:   Care Coordinator will try to reach patient again in 3-5 business days.    Next outreach due: 8/16/24

## 2024-08-19 ENCOUNTER — PATIENT OUTREACH (OUTPATIENT)
Dept: CARE COORDINATION | Facility: CLINIC | Age: 64
End: 2024-08-19
Payer: COMMERCIAL

## 2024-08-19 NOTE — PROGRESS NOTES
Clinic Care Coordination Contact    Situation: Patient chart reviewed by care coordinator.    Background: Patient is enrolled     Assessment: CHW attempting to reach patient for update, will try again mid month     Plan/Recommendations: SW will review in 1-2 weeks    HOWIE Paredes, MSW   [facility]  Care Coordination  [facility]  [phone]  8/19/2024 8:45 AM

## 2024-08-20 ENCOUNTER — PATIENT OUTREACH (OUTPATIENT)
Dept: CARE COORDINATION | Facility: CLINIC | Age: 64
End: 2024-08-20
Payer: COMMERCIAL

## 2024-08-20 NOTE — LETTER
[facility]  [address] [address]    August 20, 2024    Kendell Caban  [address] [facility] [address]      Dear Kendell,    I have been attempting to reach you since our last contact. I would like to continue to work with you and provide any additional support you may need on achieving your health care related goals. I would appreciate if you would give me a call at [phone] to let me know if you would like to continue working together. I know that there are many things that can affect our ability to communicate and I hope we can continue to work together.    All of us at the [facility] are invested in your health and are here to assist you in meeting your goals.     Sincerely,    GABE Mcgee

## 2024-08-20 NOTE — PROGRESS NOTES
[facility]/Voicemail    Clinical Data: Care Coordinator Outreach    Outreach Documentation Number of Outreach Attempt   8/9/2024  11:11 AM 1   8/20/2024   2:50 PM 2       Left message on patient's voicemail with call back information and requested return call.    Plan: Care Coordinator will send unable to contact letter with care coordinator contact information via [employer]. Care Coordinator will try to reach patient again in 10 business days.    Next outreach due: 9/30/24

## 2024-08-21 ENCOUNTER — PATIENT OUTREACH (OUTPATIENT)
Dept: CARE COORDINATION | Facility: CLINIC | Age: 64
End: 2024-08-21
Payer: COMMERCIAL

## 2024-08-21 NOTE — PROGRESS NOTES
Clinic Care Coordination Contact    Situation: Patient chart reviewed by care coordinator.    Background: Patient is enrolled     Assessment: CHW attempting to reach patient x2, has sent Unable to Contact letter with plan to call at end of the month     Plan/Recommendations: SW will review in 2-3 weeks     HOWIE Paredes, MSW   [facility]  Care Coordination  [facility]  [phone]  8/21/2024 11:19 AM

## 2024-08-30 ENCOUNTER — PATIENT OUTREACH (OUTPATIENT)
Dept: CARE COORDINATION | Facility: CLINIC | Age: 64
End: 2024-08-30
Payer: COMMERCIAL

## 2024-09-04 ENCOUNTER — PATIENT OUTREACH (OUTPATIENT)
Dept: CARE COORDINATION | Facility: CLINIC | Age: 64
End: 2024-09-04
Payer: COMMERCIAL

## 2024-09-04 NOTE — Clinical Note
Please advise how to assist patient. Cassidy check in but he has gotten no answers on [facility] application. Who to escalate to?

## 2024-09-04 NOTE — PROGRESS NOTES
Last FRW note indicates they mailed [facility] application to pt 8/5/24. No [facility] notes in FAM section in Epic.     Routing to FRW to respond. FRW is due for outreach this week per FRW episode.     Mary Schmitt, HOWIE   Social Work Primary Care Clinic Care Coordinator    Covering for KAYLEE Sommers CC

## 2024-09-04 NOTE — PROGRESS NOTES
Clinic Care Coordination Contact  Community Health Worker Follow Up    Care Gaps:     Health Maintenance Due   Topic Date Due    YEARLY PREVENTIVE VISIT  Never done    ADVANCE CARE PLANNING  Never done    Pneumococcal Vaccine: Pediatrics (0 to 5 Years) and At-Risk Patients (6 to 64 Years) (1 of 2 - PCV) Never done    COLORECTAL CANCER SCREENING  Never done    HIV SCREENING  Never done    HEPATITIS C SCREENING  Never done    ZOSTER IMMUNIZATION (1 of 2) Never done    RSV VACCINE (1 - 1-dose 60+ series) Never done    INFLUENZA VACCINE (1) Never done    COVID-19 Vaccine (1 - 2023-24 season) Never done       Reminded patient he is due for preventative visit and update immunizations.     Care Plan:   Care Plan: Health Maintenance       Problem: Health Maintenance Due or Overdue       Goal: Become up-to-date with health maintenance visit(s)       Start Date: 10/19/2023 Expected End Date: 9/4/2024    This Visit's Progress: 80% Recent Progress: 70%    Priority: Low    Note:     Barriers: overdue for an yearly preventative exam, needs insurance   Strengths: patient will consider scheduling this exam once insurance is organized.  Working with FRW on insurance needs   Patient expressed understanding of goal: yes  Action steps to achieve this goal:  1. I will find time to call and schedule yearly exam   2. I will discuss my need for yearly exam                                 Intervention and Education during outreach: Patient states that he is getting frustrated with his [facility] application. FRWs check in but haven't been able to give him answers. He is wondering if he needs to submit a new application, or what is holding up this process. He currently has no income since he has been unable to work and he is getting collection calls.     CHW Plan: Will send message to RADHA PAGE to discuss how to assist patient.     Next outreach due: 10/4/24

## 2024-09-05 ENCOUNTER — PATIENT OUTREACH (OUTPATIENT)
Dept: CARE COORDINATION | Facility: CLINIC | Age: 64
End: 2024-09-05
Payer: COMMERCIAL

## 2024-09-05 NOTE — PROGRESS NOTES
Clinic Care Coordination Contact    Per recent W note, patient has questions regarding [facility] application.  He reported to W that he has no current income as unable to work and is receiving calls from [facility].  He is questioning status of application.      KAYLEE reviewed the chart.  [facility]E [facility], spoke with Franklin, who informed that [facility] application is still being reviewed.  Franklin reported that an application can take 30-90 days for review.  He stated possible longer time frame if mailed as this may take a few days for their office to receive.  KAYLEE informed of patient concern above.  Franklin recommended patient call their office and discuss payment options.  KAYLEE thanked Franklin and will call patient.    [facility]/Voicemail    Clinical Data: Care Coordinator Outreach    Outreach Documentation Number of Outreach Attempt   8/9/2024  11:11 AM 1   8/20/2024   2:50 PM 2   9/5/2024   1:00 PM 1       Left message on patient's voicemail with call back information and requested return call.    Plan: Care Coordinator will try to reach patient again in 3-5 business days.    Vivian Taylor, HOWIE, MSW   [facility]  Care Coordination  [facility]  [phone]  9/5/2024 1:00 PM

## 2024-09-05 NOTE — LETTER
[facility]  [address] [facility] [address]     September 11, 2024        Kendell Caban  [address] [facility] [address]          Dear Walter Rdz is an updated Patient Centered Plan of Care for your continued enrollment in Care Coordination. Please let us know if you have additional questions, concerns, or goals that we can assist with.    Sincerely,    Vivian Taylor, BARBW, MSW Clinic   [facility]  Care [facility]   [email or web address]  Office: [phone]  Employed by [facility] [employer] [facility]  Patient Centered Plan of Care  About Me:        Patient Name:  Kendell Caban    YOB: 1960  Age:         63 year old   [place] MRN:    [id number] Telephone Information:  Home Phone [phone]   Mobile [phone]       Address:  [address] [place] [address] Email address:  [email or web address]      Emergency Contact(s)    Name Relationship Lgl Grd Work Phone Home Phone Mobile Phone   1. PENNIE THOMPSON Significant ot*    [phone]   2. FABIÁN BRIONES Daughter    [phone]           Primary language:  English     needed? No   [place] [employer]:  [phone] op. 1  Other communication barriers:None    Preferred Method of Communication:   Maxwell  Current living arrangement: I live alone; I live in a private home    Mobility Status/ Medical Equipment: Independent        Health Maintenance  Health Maintenance Reviewed: Due/Overdue  (vaccines, advanced Care planning,Yearly preventative visit.)      My Access Plan  Medical Emergency 911   Primary Clinic Line [facility] - [phone]   24 Hour Appointment Line [phone] or  [phone]) (toll-free)   24 Hour Nurse Line [phone] (toll-free)   Preferred Urgent Care Other     Preferred Hospital  [facility]  [phone]     Preferred Pharmacy [id number] - [facility] [address]     Behavioral Health Crisis Line The National Suicide Prevention Lifeline at 1-170.239.9051 or Text/Call 988           My Care Team Members  Patient Care Team         Relationship Specialty Notifications Start End    Nathan Lockwood APRN CNP PCP - General Family Medicine  10/19/23     Phone: [phone] Fax: [phone]         [address] [place] [address]    Nathan Lockwood APRN CNP Assigned PCP   9/21/23     Phone: [phone] Fax: [phone]         [address] [place] [address]    Nathan Lockwood APRN CNP Nurse Practitioner Family Medicine  10/10/23     Phone: [phone] Fax: [phone]         [address] [place] [address]    Froilan Ring MD MD Cardiovascular Disease  10/10/23     Phone: [phone] Fax: [phone] [address] [place] [address]    Vivian Taylor BSW Lead Care Coordinator Primary Care - CC Admissions 10/16/23     Phone: [phone] Fax: [phone]        Jennifer Baird CHW Community Health Worker Primary Care - CC Admissions 10/19/23     Phone: [phone] Fax: [phone]        Froilan Ring MD Assigned Heart and Vascular Provider   10/21/23     Phone: [phone] Fax: [phone] [address] [place] [address]    Antonio Murrell, [facility] Financial Resource Worker   3/4/24     Phone: [phone]                     My Care Plans  Self Management and Treatment Plan    Care Plan  Care Plan: Health Maintenance       Problem: Health Maintenance Due or Overdue       Goal: Become up-to-date with health maintenance visit(s)       Start Date: 10/19/2023 Expected End Date: 9/4/2024    This Visit's Progress: 80% Recent Progress: 70%    Priority: Low    Note:     Barriers: overdue for an yearly preventative exam, needs insurance   Strengths: patient will consider  scheduling this exam once insurance is organized.  Working with FRW on insurance needs   Patient expressed understanding of goal: yes  Action steps to achieve this goal:  1. I will find time to call and schedule yearly exam   2. I will discuss my need for yearly exam                                 Action Plans on File: none                      Advance Care Plans/Directives:   Advanced Care Plan/Directives on file:   No    Discussed with patient/caregiver(s): No data recorded           My Medical and Care Information  Problem List   Patient Active Problem List   Diagnosis    Genital herpes    Renal colic    New onset atrial fibrillation (H)    Gall stone pancreatitis          Care Coordination Start Date: 10/12/2023   Frequency of Care Coordination: monthly, more frequently as needed     Form Last Updated: 09/11/2024

## 2024-09-05 NOTE — LETTER
[facility]  [address] [facility] [address]     September 23, 2024    Kendell Caban  [address] [facility] [address]      Dear Kendell,    I have been attempting to reach you since our last contact. I would like to continue to work with you and provide any additional support you may need on achieving your health care related goals. I would appreciate if you would give me a call at [phone] to let me know if you would like to continue working together. I know that there are many things that can affect our ability to communicate and I hope we can continue to work together.    All of us at the [facility] are invested in your health and are here to assist you in meeting your goals.     Sincerely,    HOWIE Paredes, MSW Clinic   [facility]  Care [facility]   [email or web address]  Office: [phone]  Employed by [employer]

## 2024-09-10 NOTE — PROGRESS NOTES
Clinic Care Coordination Contact  [facility]/Voicemail      Patient left return message for SW.      Clinical Data: Care Coordinator Outreach    Outreach Documentation Number of Outreach Attempt   8/9/2024  11:11 AM 1   8/20/2024   2:50 PM 2   9/5/2024   1:00 PM 1   9/10/2024   2:25 PM 1       Left message on patient's voicemail with call back information and requested return call.    Plan:  Care Coordinator will try to reach patient again in 3-5 business days.    HOWIE Paredes, MSW   [facility]  Care Coordination  [facility]  [phone]  9/10/2024 2:26 PM

## 2024-09-23 NOTE — PROGRESS NOTES
Clinic Care Coordination Contact  [facility]/Voicemail    Clinical Data: Care Coordinator Outreach    Outreach Documentation Number of Outreach Attempt   8/20/2024   2:50 PM 2   9/5/2024   1:00 PM 1   9/10/2024   2:25 PM 1   9/23/2024  10:54 AM 2       Left message on patient's voicemail with call back information and requested return call.    Plan: Care Coordinator will send unable to contact letter with care coordinator contact information via [employer]. Care Coordinator will try to reach patient again in 1 month.    HOWIE Paredes, MSW   [facility]  Care Coordination  [facility]  [phone]  9/23/2024 10:55 AM

## 2024-10-03 ENCOUNTER — PATIENT OUTREACH (OUTPATIENT)
Dept: CARE COORDINATION | Facility: CLINIC | Age: 64
End: 2024-10-03
Payer: COMMERCIAL

## 2024-10-16 ENCOUNTER — PATIENT OUTREACH (OUTPATIENT)
Dept: CARE COORDINATION | Facility: CLINIC | Age: 64
End: 2024-10-16
Payer: COMMERCIAL

## 2024-10-22 ENCOUNTER — PATIENT OUTREACH (OUTPATIENT)
Dept: CARE COORDINATION | Facility: CLINIC | Age: 64
End: 2024-10-22
Payer: COMMERCIAL

## 2024-10-22 ASSESSMENT — ACTIVITIES OF DAILY LIVING (ADL): DEPENDENT_IADLS:: INDEPENDENT

## 2024-10-22 NOTE — LETTER
[facility]  [address] [facility] [address]     October 22, 2024    Kendell Caban  [address] [place] [address]      Dear Kendell,    I have been unsuccessful in reaching you since our last contact. At this time the Care Coordination team will make no further attempts to reach you, however this does not change your ability to continue receiving care from your providers at your primary care clinic. If you need additional support from a care coordinator in the future please contact Vivian Taylor at [phone]    All of us at [facility] are invested in your health and are here to assist you in meeting your goals.     Sincerely,    Vivian Taylor, BARBW, MSW Clinic   [facility]  Care [facility]   [email or web address]  Office: [phone]  Employed by [employer]

## 2024-10-22 NOTE — PROGRESS NOTES
Clinic Care Coordination Contact  [email or web address]Voicemail    Clinical Data: Care Coordinator Outreach    Outreach Documentation Number of Outreach Attempt   9/10/2024   2:25 PM 1   9/23/2024  10:54 AM 2   10/22/2024  10:50 AM 4       Left message on patient's voicemail with call back information and requested return call.    Plan: Care Coordinator will send disenrollment letter with care coordinator contact information via [employer]. Care Coordinator will do no further outreaches at this time.  SW will update PCP and close to CC     HOWIE Paredes, MSW   [facility]  Care Coordination  [facility]  [phone]  10/22/2024 10:51 AM

## 2024-12-22 ENCOUNTER — HEALTH MAINTENANCE LETTER (OUTPATIENT)
Age: 64
End: 2024-12-22

## (undated) DEVICE — WIRE GUIDE 0.035"X450CM JAGWIRE HP STR TIP M00556580

## (undated) DEVICE — ESU GROUND PAD ADULT W/CORD E7507

## (undated) DEVICE — DECANTER BAG 2002S

## (undated) DEVICE — ENDO PROBE COVER ULTRASOUND BALLOON LATEX  MAJ-249

## (undated) DEVICE — ESU PENCIL W/HOLSTER E2350H

## (undated) DEVICE — GOWN XLG DISP 9545

## (undated) DEVICE — ENDO CATH BALLOON EXTRACTOR PRO RX 09-12MM 4700

## (undated) DEVICE — ESU CORD MONOPOLAR 10'  E0510

## (undated) DEVICE — KIT PROCEDURE W/CLEAN-A-SCOPE LINERS V2 200800

## (undated) DEVICE — Device

## (undated) DEVICE — PACK ERCP CUSTOM RIDGES

## (undated) DEVICE — LINEN POUCH DBL 5427

## (undated) DEVICE — SU VICRYL 0 CT-2 CR 8X18" J727D

## (undated) DEVICE — GLOVE BIOGEL PI MICRO SZ 8.0 48580

## (undated) DEVICE — SOL NACL 0.9% IRRIG 3000ML BAG 2B7477

## (undated) DEVICE — MANIFOLD NEPTUNE 4 PORT 700-20

## (undated) DEVICE — RAD RX ISOVUE 300 (50ML) 61% IOPAMIDOL CHARGE PER ML

## (undated) DEVICE — CLIP APPLIER ENDO 5MM M/L LIGAMAX EL5ML

## (undated) DEVICE — INTRODUCER ENDOSCOPIC OASIS 10FRX205CM OA-10

## (undated) DEVICE — ESU ELEC BLADE 2.75" COATED/INSULATED E1455

## (undated) DEVICE — ENDO FUSION OMNI-TOME G31903

## (undated) DEVICE — GLOVE BIOGEL PI MICRO SZ 7.5 48575

## (undated) DEVICE — COTTON-LEUNG BILIARY STENT
Type: IMPLANTABLE DEVICE | Site: MOUTH | Status: NON-FUNCTIONAL
Brand: COTTON-LEUNG
Removed: 2023-09-08

## (undated) DEVICE — ENDO TROCAR OPTICAL ACCESS KII Z-THRD 12X100MM C0R85

## (undated) DEVICE — SUCTION IRR STRYKERFLOW II W/TIP 250-070-520

## (undated) DEVICE — PREP CHLORAPREP 26ML TINTED HI-LITE ORANGE 930815

## (undated) DEVICE — WIRE GUIDE 0.035"X450CM DREAMWIRE M00556140

## (undated) DEVICE — SOL NACL 0.9% INJ 250ML BAG 2B1322Q

## (undated) DEVICE — ENDO TROCAR SLEEVE KII Z-THREADED 05X100MM CTS02

## (undated) DEVICE — ENDO TROCAR FIRST ENTRY KII FIOS Z-THRD 05X100MM CTF03

## (undated) DEVICE — LINEN TOWEL PACK X5 5464

## (undated) DEVICE — BLADE CLIPPER SGL USE 9680

## (undated) DEVICE — KIT ENDO FIRST STEP DISINFECTANT 200ML W/POUCH EP-4

## (undated) DEVICE — SOL NACL 0.9% IRRIG 1000ML BOTTLE 2F7124

## (undated) DEVICE — LINEN HALF SHEET 5512

## (undated) DEVICE — SUCTION CANISTER MEDIVAC LINER 3000ML W/LID 65651-530

## (undated) DEVICE — SOL WATER IRRIG 1000ML BOTTLE 2F7114

## (undated) DEVICE — BAG CLEAR TRASH 1.3M 39X33" P4040C

## (undated) DEVICE — LINEN FULL SHEET 5511

## (undated) DEVICE — ENDO POUCH UNIV RETRIEVAL SYSTEM INZII 10MM CD001

## (undated) DEVICE — SU VICRYL 4-0 P-3 18" UND J494G

## (undated) DEVICE — COVER FOOTSWITCH URO

## (undated) RX ORDER — PROPOFOL 10 MG/ML
INJECTION, EMULSION INTRAVENOUS
Status: DISPENSED
Start: 2023-09-09

## (undated) RX ORDER — CIPROFLOXACIN 2 MG/ML
INJECTION, SOLUTION INTRAVENOUS
Status: DISPENSED
Start: 2023-09-08

## (undated) RX ORDER — LIDOCAINE HYDROCHLORIDE 10 MG/ML
INJECTION, SOLUTION EPIDURAL; INFILTRATION; INTRACAUDAL; PERINEURAL
Status: DISPENSED
Start: 2023-09-09

## (undated) RX ORDER — CEFAZOLIN SODIUM/WATER 2 G/20 ML
SYRINGE (ML) INTRAVENOUS
Status: DISPENSED
Start: 2023-09-09

## (undated) RX ORDER — GLYCOPYRROLATE 0.2 MG/ML
INJECTION INTRAMUSCULAR; INTRAVENOUS
Status: DISPENSED
Start: 2023-09-09

## (undated) RX ORDER — FENTANYL CITRATE 50 UG/ML
INJECTION, SOLUTION INTRAMUSCULAR; INTRAVENOUS
Status: DISPENSED
Start: 2023-09-08

## (undated) RX ORDER — INDOCYANINE GREEN AND WATER 25 MG
KIT INJECTION
Status: DISPENSED
Start: 2023-09-09

## (undated) RX ORDER — DEXAMETHASONE SODIUM PHOSPHATE 4 MG/ML
INJECTION, SOLUTION INTRA-ARTICULAR; INTRALESIONAL; INTRAMUSCULAR; INTRAVENOUS; SOFT TISSUE
Status: DISPENSED
Start: 2023-09-09

## (undated) RX ORDER — ONDANSETRON 2 MG/ML
INJECTION INTRAMUSCULAR; INTRAVENOUS
Status: DISPENSED
Start: 2023-09-09

## (undated) RX ORDER — BUPIVACAINE HYDROCHLORIDE AND EPINEPHRINE 5; 5 MG/ML; UG/ML
INJECTION, SOLUTION EPIDURAL; INTRACAUDAL; PERINEURAL
Status: DISPENSED
Start: 2023-09-09

## (undated) RX ORDER — FENTANYL CITRATE 50 UG/ML
INJECTION, SOLUTION INTRAMUSCULAR; INTRAVENOUS
Status: DISPENSED
Start: 2023-09-09